# Patient Record
Sex: FEMALE | Race: WHITE | NOT HISPANIC OR LATINO | Employment: FULL TIME | URBAN - METROPOLITAN AREA
[De-identification: names, ages, dates, MRNs, and addresses within clinical notes are randomized per-mention and may not be internally consistent; named-entity substitution may affect disease eponyms.]

---

## 2017-04-26 ENCOUNTER — NON-PROVIDER VISIT (OUTPATIENT)
Dept: OBGYN | Facility: CLINIC | Age: 32
End: 2017-04-26
Payer: MEDICAID

## 2017-04-26 DIAGNOSIS — Z32.01 PREGNANCY EXAMINATION OR TEST, POSITIVE RESULT: ICD-10-CM

## 2017-04-26 LAB
INT CON NEG: NEGATIVE
INT CON POS: POSITIVE
POC URINE PREGNANCY TEST: POSITIVE

## 2017-04-26 PROCEDURE — 81025 URINE PREGNANCY TEST: CPT | Performed by: OBSTETRICS & GYNECOLOGY

## 2017-04-28 ENCOUNTER — HOSPITAL ENCOUNTER (EMERGENCY)
Facility: MEDICAL CENTER | Age: 32
End: 2017-04-28
Payer: MEDICAID

## 2017-04-28 VITALS
BODY MASS INDEX: 32.07 KG/M2 | WEIGHT: 187.83 LBS | DIASTOLIC BLOOD PRESSURE: 69 MMHG | HEIGHT: 64 IN | TEMPERATURE: 97.6 F | HEART RATE: 88 BPM | SYSTOLIC BLOOD PRESSURE: 116 MMHG | RESPIRATION RATE: 16 BRPM | OXYGEN SATURATION: 100 %

## 2017-04-28 PROCEDURE — 302449 STATCHG TRIAGE ONLY (STATISTIC)

## 2017-04-28 ASSESSMENT — PAIN SCALES - GENERAL: PAINLEVEL_OUTOF10: 0

## 2017-04-28 NOTE — ED NOTES
Pt arrived pov with c/o left hand pain/numbness. Pt stated it started several s=days ago. Pt denies trauma. Moving all extremeties. Denies other.

## 2017-04-29 NOTE — ED NOTES
Called Pt name several times in Main Lobby and in Senior Lounge pt didn't answer , will call Pt's name in 10 MINS.

## 2017-05-17 ENCOUNTER — INITIAL PRENATAL (OUTPATIENT)
Dept: OBGYN | Facility: CLINIC | Age: 32
End: 2017-05-17
Payer: MEDICAID

## 2017-05-17 VITALS
HEIGHT: 64 IN | BODY MASS INDEX: 32.1 KG/M2 | DIASTOLIC BLOOD PRESSURE: 60 MMHG | SYSTOLIC BLOOD PRESSURE: 106 MMHG | WEIGHT: 188 LBS

## 2017-05-17 DIAGNOSIS — O20.0 ABORTION, THREATENED, ANTEPARTUM: ICD-10-CM

## 2017-05-17 PROCEDURE — 76805 OB US >/= 14 WKS SNGL FETUS: CPT | Performed by: OBSTETRICS & GYNECOLOGY

## 2017-05-17 NOTE — MR AVS SNAPSHOT
"        Tiffany Shepherd   2017 9:30 AM   Initial Prenatal   MRN: [id number]    [facility]   Dept Phone:  [phone]    Description:  Female : 1985   Provider:  Camron Stone M.D.           Allergies as of 2017     No Known Allergies      Vital Signs     Blood Pressure Height Weight Body Mass Index Smoking Status       106/60 mmHg 1.626 m (5' 4\") 85.276 kg (188 lb) 32.25 kg/m2 Light Tobacco Smoker       Basic Information     Date Of Birth Sex Race Ethnicity Preferred Language    1985 Female White Non- English      Problem List              ICD-10-CM Priority Class Noted - Resolved    Insufficient prenatal care in third trimester O09.33   2015 - Present    Homeless single person Z59.0   2015 - Present    Health examination of prisoner just released from long-term Z02.89   2015 - Present      Health Maintenance        Date Due Completion Dates    PAP SMEAR 2018    IMM DTaP/Tdap/Td Vaccine (2 - Td) 2025            Current Immunizations     MMR Vaccine 2015 10:26 PM    Tdap Vaccine 2015 10:25 PM      Below and/or attached are the medications your provider expects you to take. Review all of your home medications and newly ordered medications with your provider and/or pharmacist. Follow medication instructions as directed by your provider and/or pharmacist. Please keep your medication list with you and share with your provider. Update the information when medications are discontinued, doses are changed, or new medications (including over-the-counter products) are added; and carry medication information at all times in the event of emergency situations     Allergies:  No Known Allergies          Medications  Valid as of: May 17, 2017 - 10:48 AM    Generic Name Brand Name Tablet Size Instructions for use    Cefdinir (Cap) OMNICEF 300 MG Take 1 Cap by mouth 2 times a day.        Docusate Sodium (Cap) COLACE 100 MG Take 1 " Cap by mouth 2 times a day.        Ferrous Sulfate (Tab) ferrous sulfate 325 (65 FE) MG Take 1 Tab by mouth every day.        Ibuprofen (Tab) MOTRIN 800 MG Take 1 Tab by mouth every 6 hours as needed (cramping after delivery).        Norgestimate-Eth Estradiol (Tab) ORTHO-CYCLEN 0.25-35 MG-MCG Take 1 Tab by mouth every day.        Prenatal MV-Min-Fe Fum-FA-DHA   Take  by mouth.        .                 Medicines prescribed today were sent to:     [facility] [address] [place] [address] - [address] [facility] [address]    Phone: [phone] Fax: [phone]    Open 24 Hours?: No      Medication refill instructions:       If your prescription bottle indicates you have medication refills left, it is not necessary to call your provider’s office. Please contact your pharmacy and they will refill your medication.    If your prescription bottle indicates you do not have any refills left, you may request refills at any time through one of the following ways: The online [employer] system (except Urgent Care), by calling your provider’s office, or by asking your pharmacy to contact your provider’s office with a refill request. Medication refills are processed only during regular business hours and may not be available until the next business day. Your provider may request additional information or to have a follow-up visit with you prior to refilling your medication.   *Please Note: Medication refills are assigned a new Rx number when refilled electronically. Your pharmacy may indicate that no refills were authorized even though a new prescription for the same medication is available at the pharmacy. Please request the medicine by name with the pharmacy before contacting your provider for a refill.        Other Notes About Your Plan     GIRL = Karolyn Corona           [employer] Access Code: [email or web address]  Expires: 5/26/2017  4:35 PM    [employer]  A secure, online tool to manage your health information     [facility]’s MyChart® is a secure, online tool that connects you to your personalized health information from the privacy of your home -- day or night - making it very easy for you to manage your healthcare. Once the activation process is completed, you can even access your medical information using the [employer] favio, which is available for free in the Apple Favio store or Google Play store.     [employer] provides the following levels of access (as shown below):   My Chart Features   [facility] Doctor [facility]  Specialists [facility]  Urgent  Care Non-[facility]  Primary Care  Doctor   Email your healthcare team securely and privately 24/7 X X X    Manage appointments: schedule your next appointment; view details of past/upcoming appointments X      Request prescription refills. X      View recent personal medical records, including lab and immunizations X X X X   View health record, including health history, allergies, medications X X X X   Read reports about your outpatient visits, procedures, consult and ER notes X X X X   See your discharge summary, which is a recap of your hospital and/or ER visit that includes your diagnosis, lab results, and care plan. X X       How to register for [employer]  1. Go to  [email or web address].  2. Click on the Sign Up Now box, which takes you to the New Member Sign Up page. You will need to provide the following information:  a. Enter your [employer] Access Code exactly as it appears at the top of this page. (You will not need to use this code after you’ve completed the sign-up process. If you do not sign up before the expiration date, you must request a new code.)   b. Enter your date of birth.   c. Enter your home email address.   d. Click Submit, and follow the next screen’s instructions.  3. Create a [employer] ID. This will be your [employer] login ID and cannot be changed, so think of one that is secure and easy to remember.  4. Create a [employer] password. You can change your password at  any time.  5. Enter your Password Reset Question and Answer. This can be used at a later time if you forget your password.   6. Enter your e-mail address. This allows you to receive e-mail notifications when new information is available in [employer].  7. Click Sign Up. You can now view your health information.    For assistance activating your [employer] account, call [phone]        Quit Tobacco Information     Do you want to quit using tobacco?    Quitting tobacco decreases risks of cancer, heart and lung disease, increases life expectancy, improves sense of taste and smell, and increases spending money, among other benefits.    If you are thinking about quitting, we can help.  • [facility] Quit Tobacco Program: 763.104.1457  o Program occurs weekly for four weeks and includes pharmacist consultation on products to support quitting smoking or chewing tobacco. A provider referral is needed for pharmacist consultation.  • Tobacco Users Help Hotline: 2-018-QUIT-NOW (425-2116) or https://[place].quitlogix.org/  o Free, confidential telephone and online coaching for [place] residents. Sessions are designed on a schedule that is convenient for you. Eligible clients receive free nicotine replacement therapy.  • Nationally: www.smokefree.gov  o Information and professional assistance to support both immediate and long-term needs as you become, and remain, a non-smoker. Smokefree.gov allows you to choose the help that best fits your needs.

## 2017-05-17 NOTE — PROGRESS NOTES
OB ULTRASOUND LIMITED SUMMARY  Per my Read   Transabdominal   Second/third trimester findings: BPD: 4.94 cm, Placenta localization: anterior, Fetal presentation: transverse and US HOOD: 09/20/2017  BPD/HC/AC/FL c/w 61x1dlab   Active Fetus , Positive Cardiac ,   Likely male   Afv normal     Ass:   22 week IUP scan   Poor dates   Grand multiparity   /p   Need NOB

## 2017-05-17 NOTE — PROGRESS NOTES
today. Unsure about LMP  Not taking PNV  Phone # [phone]  Pharmacy verified with patient  Vaginal spotting after sex yesterday

## 2017-05-30 ENCOUNTER — HOSPITAL ENCOUNTER (OUTPATIENT)
Facility: MEDICAL CENTER | Age: 32
End: 2017-05-30
Attending: NURSE PRACTITIONER
Payer: MEDICAID

## 2017-05-30 ENCOUNTER — INITIAL PRENATAL (OUTPATIENT)
Dept: OBGYN | Facility: CLINIC | Age: 32
End: 2017-05-30
Payer: MEDICAID

## 2017-05-30 VITALS — SYSTOLIC BLOOD PRESSURE: 128 MMHG | BODY MASS INDEX: 32.08 KG/M2 | DIASTOLIC BLOOD PRESSURE: 60 MMHG | WEIGHT: 187 LBS

## 2017-05-30 DIAGNOSIS — O24.012 PRE-EXISTING TYPE 1 DIABETES MELLITUS DURING PREGNANCY IN SECOND TRIMESTER: ICD-10-CM

## 2017-05-30 DIAGNOSIS — Z34.90 ENCOUNTER FOR SUPERVISION OF NORMAL PREGNANCY, ANTEPARTUM, UNSPECIFIED GRAVIDITY: ICD-10-CM

## 2017-05-30 DIAGNOSIS — Z34.92: ICD-10-CM

## 2017-05-30 DIAGNOSIS — F17.200 TOBACCO DEPENDENCE: ICD-10-CM

## 2017-05-30 DIAGNOSIS — B96.89 BV (BACTERIAL VAGINOSIS): ICD-10-CM

## 2017-05-30 DIAGNOSIS — N76.0 BV (BACTERIAL VAGINOSIS): ICD-10-CM

## 2017-05-30 PROBLEM — O24.919 DIABETES IN PREGNANCY: Status: ACTIVE | Noted: 2017-05-30

## 2017-05-30 PROCEDURE — 87591 N.GONORRHOEAE DNA AMP PROB: CPT

## 2017-05-30 PROCEDURE — 87491 CHLMYD TRACH DNA AMP PROBE: CPT

## 2017-05-30 PROCEDURE — 59402 PR NEW OB HIGH RISK: CPT | Performed by: NURSE PRACTITIONER

## 2017-05-30 PROCEDURE — 88175 CYTOPATH C/V AUTO FLUID REDO: CPT

## 2017-05-30 RX ORDER — METRONIDAZOLE 500 MG/1
500 TABLET ORAL 2 TIMES DAILY WITH MEALS
Qty: 14 TAB | Refills: 0 | Status: SHIPPED | OUTPATIENT
Start: 2017-05-30 | End: 2017-06-06

## 2017-05-30 NOTE — PROGRESS NOTES
Subjective:      Tiffany Shepherd is a 31 y.o. female who presents with No chief complaint on file.  Social hgistory is complicated by History of drug use, has been incarcerated in the past, no problems with thelegal community for 3 years.  All previous children except 2 year old have been adopted out.  Plans to adopt this baby out.  Is working as  at [email or web address].      Main complaint is swelling and pain in hands.  Understands this is carpal tunnel syndrome.  Has braces for her hands.  Comfort measures reviewed at length.    Diet/exercise reviewed.  Encouraged to quit smoking.  Kudos for her to quit taking drugs.            HPI    ROS       Objective:     /60 mmHg  Wt 84.823 kg (187 lb)     Physical Exam  See PE          Assessment/Plan:     1. Encounter for supervision of normal pregnancy, antepartum, unspecified     - CONSENT FOR CYSTIC FIBROSIS CARRIER TEST  - POCT Urinalysis    2. Pre-existing type 1 diabetes mellitus during pregnancy in second trimester (CMS-[facility])      3. Tobacco dependence      4. Pregnancy with adoption planned, second trimester

## 2017-05-30 NOTE — PROGRESS NOTES
NOB today. Had  on 5/17/17. Patient is thinking to give baby out for adoption. Information provided  On PNV  Last pap   Phone # [phone]  Pharmacy confirmed   + FM, denies VB, LOf or UC's  BTl consent signed  1 GTT pended

## 2017-05-30 NOTE — MR AVS SNAPSHOT
Tiffany Corona Joao   2017 1:30 PM   Initial Prenatal   MRN: [id number]    [facility]   Dept Phone:  [phone]    Description:  Female : 1985   Provider:  YARA Little; [facility] INTAKE           Allergies as of 2017     No Known Allergies      You were diagnosed with     Encounter for supervision of normal pregnancy, antepartum, unspecified    [5027895]       Pre-existing type 1 diabetes mellitus during pregnancy in second trimester (CMS-HCC)   [5631041]       Tobacco dependence   [518869]       Pregnancy with adoption planned, second trimester   [936609]       BV (bacterial vaginosis)   [823081]         Vital Signs     Blood Pressure Weight Smoking Status             128/60 mmHg 84.823 kg (187 lb) Light Tobacco Smoker         Basic Information     Date Of Birth Sex Race Ethnicity Preferred Language    1985 Female White Non- English      Your appointments     2017  2:00 PM   OB Follow Up with Julisa Lazar D.N.P.   [facility] [address] [address] [phone]              Problem List              ICD-10-CM Priority Class Noted - Resolved    Diabetes in pregnancy (CMS-HCC) with baby #3 O24.919   2017 - Present    Tobacco dependence F17.200   2017 - Present    Pregnancy with adoption planned Z34.90   2017 - Present      Health Maintenance        Date Due Completion Dates    PAP SMEAR 2018    IMM DTaP/Tdap/Td Vaccine (2 - Td) 2025            Current Immunizations     MMR Vaccine 2015 10:26 PM    Tdap Vaccine 2015 10:25 PM      Below and/or attached are the medications your provider expects you to take. Review all of your home medications and newly ordered medications with your provider and/or pharmacist. Follow medication instructions as directed by your provider and/or pharmacist. Please keep your medication list with you and share  with your provider. Update the information when medications are discontinued, doses are changed, or new medications (including over-the-counter products) are added; and carry medication information at all times in the event of emergency situations     Allergies:  No Known Allergies          Medications  Valid as of: May 30, 2017 -  2:32 PM    Generic Name Brand Name Tablet Size Instructions for use    Cefdinir (Cap) OMNICEF 300 MG Take 1 Cap by mouth 2 times a day.        Docusate Sodium (Cap) COLACE 100 MG Take 1 Cap by mouth 2 times a day.        Ferrous Sulfate (Tab) ferrous sulfate 325 (65 FE) MG Take 1 Tab by mouth every day.        Ibuprofen (Tab) MOTRIN 800 MG Take 1 Tab by mouth every 6 hours as needed (cramping after delivery).        MetroNIDAZOLE (Tab) FLAGYL 500 MG Take 1 Tab by mouth 2 times a day, with meals for 7 days.        Norgestimate-Eth Estradiol (Tab) ORTHO-CYCLEN 0.25-35 MG-MCG Take 1 Tab by mouth every day.        Prenatal MV-Min-Fe Fum-FA-DHA   Take  by mouth.        .                 Medicines prescribed today were sent to:     [facility] [address] [place] [address] [address] [place], [place] [address] [address] [place] [address]    Phone: [phone] Fax: [phone]    Open 24 Hours?: No      Medication refill instructions:       If your prescription bottle indicates you have medication refills left, it is not necessary to call your provider’s office. Please contact your pharmacy and they will refill your medication.    If your prescription bottle indicates you do not have any refills left, you may request refills at any time through one of the following ways: The online [employer] system (except Urgent Care), by calling your provider’s office, or by asking your pharmacy to contact your provider’s office with a refill request. Medication refills are processed only during regular business hours and may not be available until the next business day. Your provider may request additional information or to have a  follow-up visit with you prior to refilling your medication.   *Please Note: Medication refills are assigned a new Rx number when refilled electronically. Your pharmacy may indicate that no refills were authorized even though a new prescription for the same medication is available at the pharmacy. Please request the medicine by name with the pharmacy before contacting your provider for a refill.        Your To Do List     Future Labs/Procedures Complete By Expires    GLUCOSE 1HR GESTATIONAL  As directed 5/30/2018    PREG CNTR PRENATAL PN  As directed 5/31/2018    THINPREP RFLX HPV ASCUS W/CTNG  As directed 5/31/2018    US-OB 2ND 3RD TRI COMPLETE  As directed 11/30/2017      Other Notes About Your Plan     GIRL = Karolyn Corona           [employer] Access Code: Activation code not generated  Current MyChart Status: Active          Quit Tobacco Information     Do you want to quit using tobacco?    Quitting tobacco decreases risks of cancer, heart and lung disease, increases life expectancy, improves sense of taste and smell, and increases spending money, among other benefits.    If you are thinking about quitting, we can help.  • [facility] Quit Tobacco Program: 462.831.1145  o Program occurs weekly for four weeks and includes pharmacist consultation on products to support quitting smoking or chewing tobacco. A provider referral is needed for pharmacist consultation.  • Tobacco Users Help Hotline: 9-042-QUITNOW (649-2755) or https://[place].quitlogix.org/  o Free, confidential telephone and online coaching for [place] residents. Sessions are designed on a schedule that is convenient for you. Eligible clients receive free nicotine replacement therapy.  • Nationally: www.smokefree.gov  o Information and professional assistance to support both immediate and long-term needs as you become, and remain, a non-smoker. Smokefree.gov allows you to choose the help that best fits your needs.

## 2017-06-01 LAB
C TRACH DNA GENITAL QL NAA+PROBE: POSITIVE
CYTOLOGY REG CYTOL: ABNORMAL
N GONORRHOEA DNA GENITAL QL NAA+PROBE: NEGATIVE
SPECIMEN SOURCE: ABNORMAL

## 2017-06-02 ENCOUNTER — TELEPHONE (OUTPATIENT)
Dept: OBGYN | Facility: CLINIC | Age: 32
End: 2017-06-02

## 2017-06-02 DIAGNOSIS — A74.9 CHLAMYDIA: ICD-10-CM

## 2017-06-02 PROBLEM — R87.613 HSIL ON PAP SMEAR OF CERVIX: Status: ACTIVE | Noted: 2017-06-02

## 2017-06-02 RX ORDER — AZITHROMYCIN 500 MG/1
500 TABLET, FILM COATED ORAL ONCE
Qty: 2 TAB | Refills: 0 | Status: SHIPPED | OUTPATIENT
Start: 2017-06-02 | End: 2017-07-24 | Stop reason: SDUPTHER

## 2017-06-02 RX ORDER — AZITHROMYCIN 500 MG/1
1000 TABLET, FILM COATED ORAL ONCE
Qty: 2 TAB | Refills: 0 | Status: SHIPPED | OUTPATIENT
Start: 2017-06-02 | End: 2017-06-02

## 2017-06-02 NOTE — TELEPHONE ENCOUNTER
Received a call from [facility] Main lab reporting a +chlamydia. Lab results in providers pool to be review.

## 2017-06-02 NOTE — TELEPHONE ENCOUNTER
----- Message from Julisa Lazar D.N.P. sent at 6/2/2017 11:10 AM PDT -----  Positive chlamydia. Order placed for tx. Please contact patient.    Notes Recorded by Julisa Lazar D.N.P. on 6/2/2017 at 11:11 AM  High grade pap, needs colpo    Pt notified of abnormal pap and need colposcopy. Procedure explained to pt. Colpo scheduled for 6/12/17 at 1530. All questions answered. Pt place on hold to schedule appt but call got disconnected. Called pt back and message left to call us back.   Did not discuss +Chlamydia with pt.   Before scheduling pt for colpo dicussed it with Dr. Stone and aware pt been far along but since results are high grade pt schedule to be assess.       Called pt again and Pt notified regarding positive chlamydia and need to  Rx from her pharmacy and Advised pt make a note of the date she took medication because she needs to be retested 3 wks after she had taken meds. Advised for her partner to be treated with his PCP or [facility]. Pt verbalized understanding.   [facility] form and lab results faxed to Aggie Leslie at [facility].

## 2017-06-12 ENCOUNTER — HOSPITAL ENCOUNTER (OUTPATIENT)
Facility: MEDICAL CENTER | Age: 32
End: 2017-06-12
Attending: OBSTETRICS & GYNECOLOGY
Payer: MEDICAID

## 2017-06-12 ENCOUNTER — HOSPITAL ENCOUNTER (OUTPATIENT)
Dept: LAB | Facility: MEDICAL CENTER | Age: 32
End: 2017-06-12
Attending: NURSE PRACTITIONER
Payer: MEDICAID

## 2017-06-12 ENCOUNTER — GYNECOLOGY VISIT (OUTPATIENT)
Dept: OBGYN | Facility: CLINIC | Age: 32
End: 2017-06-12
Payer: MEDICAID

## 2017-06-12 VITALS — WEIGHT: 186 LBS | BODY MASS INDEX: 31.91 KG/M2 | DIASTOLIC BLOOD PRESSURE: 62 MMHG | SYSTOLIC BLOOD PRESSURE: 124 MMHG

## 2017-06-12 DIAGNOSIS — R87.613 HSIL ON PAP SMEAR OF CERVIX: ICD-10-CM

## 2017-06-12 DIAGNOSIS — Z34.90 ENCOUNTER FOR SUPERVISION OF NORMAL PREGNANCY, ANTEPARTUM, UNSPECIFIED GRAVIDITY: ICD-10-CM

## 2017-06-12 DIAGNOSIS — R87.613 HGSIL (HIGH GRADE SQUAMOUS INTRAEPITHELIAL LESION) ON PAP SMEAR OF CERVIX: ICD-10-CM

## 2017-06-12 LAB
ABO GROUP BLD: NORMAL
APPEARANCE UR: ABNORMAL
BACTERIA #/AREA URNS HPF: ABNORMAL /HPF
BASOPHILS # BLD AUTO: 0.5 % (ref 0–1.8)
BASOPHILS # BLD: 0.05 K/UL (ref 0–0.12)
BILIRUB UR QL STRIP.AUTO: NEGATIVE
BLD GP AB SCN SERPL QL: NORMAL
COLOR UR: YELLOW
CULTURE IF INDICATED INDCX: YES UA CULTURE
EOSINOPHIL # BLD AUTO: 0.12 K/UL (ref 0–0.51)
EOSINOPHIL NFR BLD: 1.2 % (ref 0–6.9)
EPI CELLS #/AREA URNS HPF: ABNORMAL /HPF
ERYTHROCYTE [DISTWIDTH] IN BLOOD BY AUTOMATED COUNT: 44.3 FL (ref 35.9–50)
GLUCOSE 1H P 50 G GLC PO SERPL-MCNC: 101 MG/DL (ref 70–139)
GLUCOSE UR STRIP.AUTO-MCNC: NEGATIVE MG/DL
HBV SURFACE AG SER QL: NEGATIVE
HCT VFR BLD AUTO: 37.4 % (ref 37–47)
HGB BLD-MCNC: 12.6 G/DL (ref 12–16)
HIV 1+2 AB+HIV1 P24 AG SERPL QL IA: NON REACTIVE
IMM GRANULOCYTES # BLD AUTO: 0.06 K/UL (ref 0–0.11)
IMM GRANULOCYTES NFR BLD AUTO: 0.6 % (ref 0–0.9)
KETONES UR STRIP.AUTO-MCNC: 10 MG/DL
LEUKOCYTE ESTERASE UR QL STRIP.AUTO: ABNORMAL
LYMPHOCYTES # BLD AUTO: 1.72 K/UL (ref 1–4.8)
LYMPHOCYTES NFR BLD: 16.5 % (ref 22–41)
MCH RBC QN AUTO: 29.6 PG (ref 27–33)
MCHC RBC AUTO-ENTMCNC: 33.7 G/DL (ref 33.6–35)
MCV RBC AUTO: 87.8 FL (ref 81.4–97.8)
MICRO URNS: ABNORMAL
MONOCYTES # BLD AUTO: 0.67 K/UL (ref 0–0.85)
MONOCYTES NFR BLD AUTO: 6.4 % (ref 0–13.4)
MUCOUS THREADS #/AREA URNS HPF: ABNORMAL /HPF
NEUTROPHILS # BLD AUTO: 7.81 K/UL (ref 2–7.15)
NEUTROPHILS NFR BLD: 74.8 % (ref 44–72)
NITRITE UR QL STRIP.AUTO: POSITIVE
NRBC # BLD AUTO: 0 K/UL
NRBC BLD AUTO-RTO: 0 /100 WBC
PATHOLOGY CONSULT NOTE: NORMAL
PH UR STRIP.AUTO: 6.5 [PH]
PLATELET # BLD AUTO: 233 K/UL (ref 164–446)
PMV BLD AUTO: 11.1 FL (ref 9–12.9)
PROT UR QL STRIP: NEGATIVE MG/DL
RBC # BLD AUTO: 4.26 M/UL (ref 4.2–5.4)
RBC # URNS HPF: ABNORMAL /HPF
RBC UR QL AUTO: NEGATIVE
RH BLD: NORMAL
RUBV AB SER QL: 25.4 IU/ML
SP GR UR STRIP.AUTO: 1.01
TREPONEMA PALLIDUM IGG+IGM AB [PRESENCE] IN SERUM OR PLASMA BY IMMUNOASSAY: NON REACTIVE
WBC # BLD AUTO: 10.4 K/UL (ref 4.8–10.8)
WBC #/AREA URNS HPF: ABNORMAL /HPF

## 2017-06-12 PROCEDURE — 88305 TISSUE EXAM BY PATHOLOGIST: CPT

## 2017-06-12 PROCEDURE — 87077 CULTURE AEROBIC IDENTIFY: CPT

## 2017-06-12 PROCEDURE — 86901 BLOOD TYPING SEROLOGIC RH(D): CPT

## 2017-06-12 PROCEDURE — 57454 BX/CURETT OF CERVIX W/SCOPE: CPT | Performed by: OBSTETRICS & GYNECOLOGY

## 2017-06-12 PROCEDURE — 82950 GLUCOSE TEST: CPT

## 2017-06-12 PROCEDURE — 86850 RBC ANTIBODY SCREEN: CPT

## 2017-06-12 PROCEDURE — 81001 URINALYSIS AUTO W/SCOPE: CPT

## 2017-06-12 PROCEDURE — 87186 SC STD MICRODIL/AGAR DIL: CPT

## 2017-06-12 PROCEDURE — 87389 HIV-1 AG W/HIV-1&-2 AB AG IA: CPT

## 2017-06-12 PROCEDURE — 90040 PR PRENATAL FOLLOW UP: CPT | Mod: 25 | Performed by: OBSTETRICS & GYNECOLOGY

## 2017-06-12 PROCEDURE — 86780 TREPONEMA PALLIDUM: CPT

## 2017-06-12 PROCEDURE — 86900 BLOOD TYPING SEROLOGIC ABO: CPT

## 2017-06-12 PROCEDURE — 87086 URINE CULTURE/COLONY COUNT: CPT

## 2017-06-12 PROCEDURE — 85025 COMPLETE CBC W/AUTO DIFF WBC: CPT

## 2017-06-12 PROCEDURE — 86762 RUBELLA ANTIBODY: CPT

## 2017-06-12 PROCEDURE — 87340 HEPATITIS B SURFACE AG IA: CPT

## 2017-06-12 NOTE — PROGRESS NOTES
Colposcopy-OB f/u. + fetal movement.  No VB, LOF or UC's.  PNP, 1 GTT done today  Good phone # [phone]  Preferred pharmacy confirmed.

## 2017-06-12 NOTE — MR AVS SNAPSHOT
Tiffany Shepherd   2017 3:30 PM   Gynecology Visit   MRN: [id number]    Department:  [facility]   Dept Phone:  [phone]    Description:  Female : 1985   Provider:  Angela Warner M.D.           Allergies as of 2017     No Known Allergies      You were diagnosed with     HGSIL (high grade squamous intraepithelial lesion) on Pap smear of cervix   [[id number]]         Vital Signs     Blood Pressure Weight Smoking Status             124/62 mmHg 84.369 kg (186 lb) Light Tobacco Smoker         Basic Information     Date Of Birth Sex Race Ethnicity Preferred Language    1985 Female White Non- English      Your appointments     Jono 15, 2017 10:00 AM   US PREG 60 with PREG CTR US 1   [facility] ([facility])    [facility]  [address] [id number] [phone]           For [facility] patients only: 1. Please arrive 15 min prior to your appointment time. 2. If you're late, you will be rescheduled for the next available appointment. 3. If you need to reschedule your appointment, please call us at [phone] 48 hours prior to your appointment. 4. Do not bring children as they will not be allowed in exam room. 5. Only one family member may be present in room during exam. 6. The exam will be 30-60 minutes depending on the exam ordered by the physician. 7. The sonographer is not allowed to discuss findings during the exam. Your provider will go over the results with you at your next appointment. 8. The purpose of this ultrasound is to determine if baby is healthy. Diagnostic ultrasounds are NOT to determine the gender of the baby. 9. NO photography or video recording is allowed in exam room. 10. NO cell phones allowed in the exam room. INFORMACION SOBRE DRUMMOND ULTRASONIDO 1. Por favor de llegar 15 minutos antes de drummond kyle. 2. Si llega tarde, le tenemos que cambiar la kyle para otra fecha. 3. Si necesita  cambiar drummond kyle, por favor llame 48 horas antes de la kyle. [phone] 4. Por favor no traer niños. No se permiten en cuarto de Ultrasonido. 5. Solamente se permite sarah persona en el cuarto kelsie el examen. 6. El examen dura 30-60 minutos, dependiendo del examen ordenado por el Doctor. 7. El Sonógrafo no está autorizado hablar sobre drummond examen. Drummond doctor o partera le va explicar los resultados en drummond próxima kyle. 8. El propósito del Ultrasonido es para determinar si drummond juan viene saludable. No es para determinar el sexo de drummond juan. 9. Por favor no fotos o cámaras de grabar. 10. No celulares permitidos en el cuarto de examen.            Jun 27, 2017  2:00 PM   OB Follow Up with LYNDSYA GreenePSantosh   [facility] [address] [address]   [phone]              Problem List              ICD-10-CM Priority Class Noted - Resolved    Diabetes in pregnancy (CMS-HCC) with baby #3 O24.919   5/30/2017 - Present    Tobacco dependence F17.200   5/30/2017 - Present    Pregnancy with adoption planned Z34.90   5/30/2017 - Present    Chlamydia A74.9   6/2/2017 - Present    HSIL on Pap smear of cervix R87.613   6/2/2017 - Present      Health Maintenance        Date Due Completion Dates    IMM PNEUMOCOCCAL 19-64 (ADULT) MEDIUM RISK SERIES (1 of 1 - PPSV23) 8/31/2002 ---    PAP SMEAR 5/30/2020 5/30/2017, 6/12/2015    IMM DTaP/Tdap/Td Vaccine (2 - Td) 7/4/2025 7/4/2015            Current Immunizations     MMR Vaccine 7/4/2015 10:26 PM    Tdap Vaccine 7/4/2015 10:25 PM      Below and/or attached are the medications your provider expects you to take. Review all of your home medications and newly ordered medications with your provider and/or pharmacist. Follow medication instructions as directed by your provider and/or pharmacist. Please keep your medication list with you and share with your provider. Update the information when medications are discontinued, doses are changed, or new  medications (including over-the-counter products) are added; and carry medication information at all times in the event of emergency situations     Allergies:  No Known Allergies          Medications  Valid as of: June 12, 2017 -  4:08 PM    Generic Name Brand Name Tablet Size Instructions for use    Cefdinir (Cap) OMNICEF 300 MG Take 1 Cap by mouth 2 times a day.        Docusate Sodium (Cap) COLACE 100 MG Take 1 Cap by mouth 2 times a day.        Ferrous Sulfate (Tab) ferrous sulfate 325 (65 FE) MG Take 1 Tab by mouth every day.        Ibuprofen (Tab) MOTRIN 800 MG Take 1 Tab by mouth every 6 hours as needed (cramping after delivery).        Norgestimate-Eth Estradiol (Tab) ORTHO-CYCLEN 0.25-35 MG-MCG Take 1 Tab by mouth every day.        Prenatal MV-Min-Fe Fum-FA-DHA   Take  by mouth.        .                 Medicines prescribed today were sent to:     [facility]/PH[facility]ACY #[id number] - [place], [place] - [address] [address]    Phone: [phone] Fax: [phone]    Open 24 Hours?: No      Medication refill instructions:       If your prescription bottle indicates you have medication refills left, it is not necessary to call your provider’s office. Please contact your pharmacy and they will refill your medication.    If your prescription bottle indicates you do not have any refills left, you may request refills at any time through one of the following ways: The online [employer] system (except Urgent Care), by calling your provider’s office, or by asking your pharmacy to contact your provider’s office with a refill request. Medication refills are processed only during regular business hours and may not be available until the next business day. Your provider may request additional information or to have a follow-up visit with you prior to refilling your medication.   *Please Note: Medication refills are assigned a new Rx number when refilled electronically. Your  pharmacy may indicate that no refills were authorized even though a new prescription for the same medication is available at the pharmacy. Please request the medicine by name with the pharmacy before contacting your provider for a refill.        Other Notes About Your Plan     GIRL = Karolyn Hennessy Access Code: Activation code not generated  Current MyChart Status: Active          Quit Tobacco Information     Do you want to quit using tobacco?    Quitting tobacco decreases risks of cancer, heart and lung disease, increases life expectancy, improves sense of taste and smell, and increases spending money, among other benefits.    If you are thinking about quitting, we can help.  • [facility] Quit Tobacco Program: 520.434.2451  o Program occurs weekly for four weeks and includes pharmacist consultation on products to support quitting smoking or chewing tobacco. A provider referral is needed for pharmacist consultation.  • Tobacco Users Help Hotline: 6-083-QUITNOW (560-3239) or https://[place].quitlogix.org/  o Free, confidential telephone and online coaching for [place] residents. Sessions are designed on a schedule that is convenient for you. Eligible clients receive free nicotine replacement therapy.  • Nationally: www.smokefree.gov  o Information and professional assistance to support both immediate and long-term needs as you become, and remain, a non-smoker. Smokefree.gov allows you to choose the help that best fits your needs.

## 2017-06-12 NOTE — Clinical Note
COLPOSCOPY / LEEP DATA SHEET    Tiffany Shepherd     1985      31 y.o.      No LMP recorded. Patient is pregnant.     @EDC@       Medical history:   o MVP    o Blood dyscrasia    o Rh     o Other: .    STD history:    o HPV     o HSV     o HIV     o GC     o Chlamydia     o None     o Other: .    HIV:   o Positive     o Negative                            IV Drug User:   o  Yes    o  No .    Age of first coitus:                                                Number of sex partners in lifetime:  .    Reason for exam:.    Prior abnormal PAPS?    o  Yes  o  No        Treatment: .    Prior history of condyloma?    o  Yes  o  No        Treatment:.     Colposcopic view - description:                                 Satisfactory?    o  Yes  o  No                    Squamo-columnar junction seen?  o  Yes  o  No.    Entire lesion seen?     o  Yes  o  No          PAP done?  o  Yes  o  No           Biopsies done?  o  Yes  o  No.    Biopsy sites:.    ECC done?    o  Yes  o  No      If no, reason:.    Impression:.    Recommendations:.             Colposcopist: ______________________________________________ Date: ___________________

## 2017-06-12 NOTE — PROGRESS NOTES
Indications for colposcopy are reviewed with patient  Pap smear shows Moderately to severely abnormal (HGSIL-encompassing moderate and severe dysplasi  Risks of colposcopy are discussed and informed consent is signed  The patient is placed in dorsal lithotomy position, speculum is inserted into the vagina and the cervix was visualized  Acetic acid is applied to the cervix  The colposcope was then used to evaluate the cervix  The squamocolumnar junction is not seen in its entirety.  Cervix is floppy and redundant, copious white BV discharge is present, Cervix extremely friable after biopsy.  Findings for colposcopy are: Unable to assess superior margin of TZ.  Inferior margin with AWE and cobblestoning. Bx performed at 6 oclock.   ECC is not performed  Colposcopy is not considered adequate  Hemostasis is achieved with Monsel solution  Patient did not tolerate the procedure well.  Very anxious.     FHTs 160s after colpo.  Pt took Azithromycin.  Needs to take Flagyl as prescribed.  Evaluation inadequate - will need colpo 6 wks postpartum.   Please see animation located in media, colposcopy/LEEP data sheet    The patient should followup in 1-2 weeks for results review  Pelvic rest x1 week

## 2017-06-14 LAB
BACTERIA UR CULT: ABNORMAL
SIGNIFICANT IND 70042: ABNORMAL
SOURCE SOURCE: ABNORMAL

## 2017-06-15 ENCOUNTER — APPOINTMENT (OUTPATIENT)
Dept: RADIOLOGY | Facility: IMAGING CENTER | Age: 32
End: 2017-06-15
Attending: NURSE PRACTITIONER
Payer: MEDICAID

## 2017-06-15 ENCOUNTER — TELEPHONE (OUTPATIENT)
Dept: OBGYN | Facility: CLINIC | Age: 32
End: 2017-06-15

## 2017-06-15 DIAGNOSIS — Z34.90 ENCOUNTER FOR SUPERVISION OF NORMAL PREGNANCY, ANTEPARTUM, UNSPECIFIED GRAVIDITY: ICD-10-CM

## 2017-06-15 DIAGNOSIS — O23.42 UTI (URINARY TRACT INFECTION) IN PREGNANCY IN SECOND TRIMESTER: Primary | ICD-10-CM

## 2017-06-15 PROCEDURE — 76805 OB US >/= 14 WKS SNGL FETUS: CPT | Performed by: OBSTETRICS & GYNECOLOGY

## 2017-06-15 RX ORDER — NITROFURANTOIN 25; 75 MG/1; MG/1
100 CAPSULE ORAL 2 TIMES DAILY
Qty: 14 CAP | Refills: 0 | Status: SHIPPED | OUTPATIENT
Start: 2017-06-15 | End: 2017-06-22

## 2017-06-15 NOTE — TELEPHONE ENCOUNTER
----- Message from Galina Mohan C.N.M. sent at 6/15/2017  8:08 AM PDT -----  +UTI, will send rx to pharmacy.      Pt notified of UTI and needs to start on antibiotics, instructions given. Advised pt to increase water intake to minimum of 4 lt of water a day. Rx sent by provider. Pharmacy verified with pt.

## 2017-06-16 PROBLEM — Z67.91 RH NEGATIVE, ANTEPARTUM: Status: ACTIVE | Noted: 2017-06-16

## 2017-06-16 PROBLEM — O26.899 RH NEGATIVE, ANTEPARTUM: Status: ACTIVE | Noted: 2017-06-16

## 2017-06-19 ENCOUNTER — TELEPHONE (OUTPATIENT)
Dept: OBGYN | Facility: CLINIC | Age: 32
End: 2017-06-19

## 2017-06-19 NOTE — TELEPHONE ENCOUNTER
Aggie from the [facility] called in regards to Pt Tiffany Shepherd. Per Aggie pt was treated with azithromycin, but her partner refuses to be treated. Pt informed Aggie that she had been sexually active with her partner and may have been re-exposed. Aggie advises that Pt be re-tested and if needed re-treated.

## 2017-06-28 ENCOUNTER — HOSPITAL ENCOUNTER (OUTPATIENT)
Facility: MEDICAL CENTER | Age: 32
End: 2017-06-28
Attending: NURSE PRACTITIONER
Payer: MEDICAID

## 2017-06-28 ENCOUNTER — ROUTINE PRENATAL (OUTPATIENT)
Dept: OBGYN | Facility: CLINIC | Age: 32
End: 2017-06-28
Payer: MEDICAID

## 2017-06-28 VITALS — SYSTOLIC BLOOD PRESSURE: 118 MMHG | WEIGHT: 187 LBS | DIASTOLIC BLOOD PRESSURE: 68 MMHG | BODY MASS INDEX: 32.08 KG/M2

## 2017-06-28 DIAGNOSIS — Z67.91 RH NEGATIVE, ANTEPARTUM, THIRD TRIMESTER: ICD-10-CM

## 2017-06-28 DIAGNOSIS — Z34.83 ENCOUNTER FOR SUPERVISION OF OTHER NORMAL PREGNANCY, THIRD TRIMESTER: Primary | ICD-10-CM

## 2017-06-28 DIAGNOSIS — O26.893 RH NEGATIVE STATE IN ANTEPARTUM PERIOD, THIRD TRIMESTER: ICD-10-CM

## 2017-06-28 DIAGNOSIS — A74.9 CHLAMYDIA INFECTION AFFECTING PREGNANCY: ICD-10-CM

## 2017-06-28 DIAGNOSIS — O26.893 RH NEGATIVE, ANTEPARTUM, THIRD TRIMESTER: ICD-10-CM

## 2017-06-28 DIAGNOSIS — Z67.91 RH NEGATIVE STATE IN ANTEPARTUM PERIOD, THIRD TRIMESTER: ICD-10-CM

## 2017-06-28 DIAGNOSIS — O98.819 CHLAMYDIA INFECTION AFFECTING PREGNANCY: ICD-10-CM

## 2017-06-28 PROCEDURE — 87491 CHLMYD TRACH DNA AMP PROBE: CPT

## 2017-06-28 PROCEDURE — 90040 PR PRENATAL FOLLOW UP: CPT | Performed by: NURSE PRACTITIONER

## 2017-06-28 PROCEDURE — 90471 IMMUNIZATION ADMIN: CPT | Mod: 59 | Performed by: NURSE PRACTITIONER

## 2017-06-28 PROCEDURE — 96372 THER/PROPH/DIAG INJ SC/IM: CPT | Performed by: NURSE PRACTITIONER

## 2017-06-28 PROCEDURE — 87591 N.GONORRHOEAE DNA AMP PROB: CPT

## 2017-06-28 PROCEDURE — 90715 TDAP VACCINE 7 YRS/> IM: CPT | Performed by: NURSE PRACTITIONER

## 2017-06-28 NOTE — Clinical Note
"Count Your Baby's Movements  Another step to a healthy delivery    Tiffany Shepherd              Dept: [phone]    How Many Weeks Pregnant 28w0d    Date to Begin Countin17              How to use this chart    One way for your physician to keep track of your baby's health is by knowing how often the baby moves (or \"kicks\") in your womb.  You can help your physician to do this by using this chart every day.    Every day, you should see how many hours it takes for your baby to move 10 times.  Start in the morning, as soon as you get up.    · First, write down the time your baby moves until you get to 10.  · Check off one box every time your baby moves until you get to 10.  · Write down the time you finished counting in the last column.  · Total how long it took to count up all 10 movements.  · Finally, fill in the box that shows how long this took.  After counting 10 movements, you no longer have to count any more that day.  The next morning, just start counting again as soon as you get up.    What should you call a \"movement\"?  It is hard to say, because it will feel different from one mother to another and from one pregnancy to the next.  The important thing is that you count the movements the same way throughout your pregnancy.  If you have more questions, you should ask your physician.    Count carefully every day!  SAMPLE:  Week 28    How many hours did it take to feel 10 movements?       Start  Time     1     2     3     4     5     6     7     8     9     10   Finish Time   Mon 8:20 ·  ·  ·  ·  ·  ·  ·  ·  ·  ·  11:40                  Fri               Sat               Sun                 IMPORTANT: You should contact your physician if it takes more than two hours for you to feel 10 movements.  Each morning, write down the time and start to count the movements of your baby.  Keep track by checking off one box every time you feel one movement.  When you have " "felt 10 \"kicks\", write down the time you finished counting in the last column.  Then fill in the   box (over the check betina) for the number of hours it took.  Be sure to read the complete instructions on the previous page.            "

## 2017-06-28 NOTE — Clinical Note
June 28, 2017         Patient: Tiffany Shepherd   YOB: 1985   Date of Visit: 6/28/2017           To Whom it May Concern:    Tifafny Shepherd was seen in my clinic on 6/28/2017, please excuse her for being late to work today due to Doctor appointment.     If you have any questions or concerns, please don't hesitate to call.        Sincerely,                       Alyssa Mahoney C.N.M.

## 2017-06-28 NOTE — PROGRESS NOTES
Pt. Here for OB/F/U today Kick Count sheet given and explained to pt.   Good FM  Good # [phone]  Pt states having numbness in hands.   Pharmacy verified.   BTL consent form signed today.  JAGDEEP for Chlamydia today, patient believes she has been exposed again.   Tdap today.

## 2017-06-28 NOTE — MR AVS SNAPSHOT
Tiffany Shepherd   2017 8:30 AM   Routine Prenatal   MRN: [id number]    [facility]   Dept Phone:  [phone]    Description:  Female : 1985   Provider:  Alyssa Mahoney C.N.M.           Allergies as of 2017     No Known Allergies      You were diagnosed with     Encounter for supervision of other normal pregnancy, third trimester   [[id number]]  -  Primary     Rh negative state in antepartum period, third trimester   [[id number]]       Chlamydia infection affecting pregnancy   [0720256]         Vital Signs     Blood Pressure Weight Smoking Status             118/68 mmHg 84.823 kg (187 lb) Light Tobacco Smoker         Basic Information     Date Of Birth Sex Race Ethnicity Preferred Language    1985 Female White Non- English      Problem List              ICD-10-CM Priority Class Noted - Resolved    Diabetes in pregnancy (CMS-HCC) with baby #3 O24.919   2017 - Present    Tobacco dependence F17.200   2017 - Present    Pregnancy with adoption planned Z34.90   2017 - Present    Chlamydia A74.9   2017 - Present    HSIL on Pap smear of cervix R87.613   2017 - Present    Rh negative, antepartum O09.899   2017 - Present      Health Maintenance        Date Due Completion Dates    IMM PNEUMOCOCCAL 19-64 (ADULT) MEDIUM RISK SERIES (1 of 1 - PPSV23) 2002 ---    PAP SMEAR 2020, 2015    IMM DTaP/Tdap/Td Vaccine (2 - Td) 2025            Current Immunizations     MMR Vaccine 2015 10:26 PM    Tdap Vaccine 2017  9:31 AM, 2015 10:25 PM      Below and/or attached are the medications your provider expects you to take. Review all of your home medications and newly ordered medications with your provider and/or pharmacist. Follow medication instructions as directed by your provider and/or pharmacist. Please keep your medication list with you and share with your provider. Update the information when  medications are discontinued, doses are changed, or new medications (including over-the-counter products) are added; and carry medication information at all times in the event of emergency situations     Allergies:  No Known Allergies          Medications  Valid as of: June 28, 2017 -  9:50 AM    Generic Name Brand Name Tablet Size Instructions for use    Cefdinir (Cap) OMNICEF 300 MG Take 1 Cap by mouth 2 times a day.        Docusate Sodium (Cap) COLACE 100 MG Take 1 Cap by mouth 2 times a day.        Ferrous Sulfate (Tab) ferrous sulfate 325 (65 FE) MG Take 1 Tab by mouth every day.        Ibuprofen (Tab) MOTRIN 800 MG Take 1 Tab by mouth every 6 hours as needed (cramping after delivery).        Norgestimate-Eth Estradiol (Tab) ORTHO-CYCLEN 0.25-35 MG-MCG Take 1 Tab by mouth every day.        Prenatal MV-Min-Fe Fum-FA-DHA   Take  by mouth.        .                 Medicines prescribed today were sent to:     [facility]/PHARMACY #[id number] - [place], [place] - [address] [place] [address]    Phone: [phone] Fax: [phone]    Open 24 Hours?: No      Medication refill instructions:       If your prescription bottle indicates you have medication refills left, it is not necessary to call your provider’s office. Please contact your pharmacy and they will refill your medication.    If your prescription bottle indicates you do not have any refills left, you may request refills at any time through one of the following ways: The online [employer] system (except Urgent Care), by calling your provider’s office, or by asking your pharmacy to contact your provider’s office with a refill request. Medication refills are processed only during regular business hours and may not be available until the next business day. Your provider may request additional information or to have a follow-up visit with you prior to refilling your medication.   *Please Note: Medication refills are assigned a  new Rx number when refilled electronically. Your pharmacy may indicate that no refills were authorized even though a new prescription for the same medication is available at the pharmacy. Please request the medicine by name with the pharmacy before contacting your provider for a refill.        Your To Do List     Future Labs/Procedures Complete By Expires    CHLAMYDIA/GC PCR URINE OR SWAB  As directed 6/29/2018      Other Notes About Your Plan     GIRL = Karolyn Corona           MyChart Access Code: Activation code not generated  Current MyChart Status: Active          Quit Tobacco Information     Do you want to quit using tobacco?    Quitting tobacco decreases risks of cancer, heart and lung disease, increases life expectancy, improves sense of taste and smell, and increases spending money, among other benefits.    If you are thinking about quitting, we can help.  • [facility] Quit Tobacco Program: 435.236.9148  o Program occurs weekly for four weeks and includes pharmacist consultation on products to support quitting smoking or chewing tobacco. A provider referral is needed for pharmacist consultation.  • Tobacco Users Help Hotline: 5-784-QUITNOW (228-7329) or https://[place].quitlogix.org/  o Free, confidential telephone and online coaching for [place] residents. Sessions are designed on a schedule that is convenient for you. Eligible clients receive free nicotine replacement therapy.  • Nationally: www.smokefree.gov  o Information and professional assistance to support both immediate and long-term needs as you become, and remain, a non-smoker. Smokefree.gov allows you to choose the help that best fits your needs.

## 2017-06-28 NOTE — PROGRESS NOTES
S) Pt is a 31 y.o.   at 28w0d  gestation. Routine prenatal care today. No complaints today. Is upset with FOB regarding his lack of concern for the health and wellbeing of her and the baby. He has not yet been treated for Chlamydia saying its just too hard to make it to the health department to get it done. Prescription given to patient today for his treatment. Will retreat if + again.    Fetal movement Normal  Cramping no,  VB no  LOF no   Denies dysuria. Generally feels well today. Good self-care activities identified. Denies headaches, swelling, visual changes, or epigastric pain .     O) Blood pressure 118/68, weight 84.823 kg (187 lb), unknown if currently breastfeeding.        Labs:       PNL: WNL except for +Chlamydia       GCT: 101       AFP: Not done       GBS: N/A       Pertinent ultrasound -        6/15/17- Survey WNL except for mildly enlarge renal pelvises, and a ? Dec femur length. VERENICE 19.05 cm. C/w dates    A) IUP at 28w0d       S=D         Patient Active Problem List    Diagnosis Date Noted   • Rh negative, antepartum 2017   • Chlamydia 2017   • HSIL on Pap smear of cervix 2017   • Diabetes in pregnancy (CMS-formerly [facility]) with baby #3 2017   • Tobacco dependence 2017   • Pregnancy with adoption planned 2017                 TDAP: yes       BTL: yes       : n/a    P) s/s ptl vs general discomforts. Fetal movements reviewed. General ed and anticipatory guidance. Nutrition/exercise/vitamin. Plans breast Plans pp contraception- BTL.  Continue PNV.

## 2017-06-29 DIAGNOSIS — Z34.83 ENCOUNTER FOR SUPERVISION OF OTHER NORMAL PREGNANCY, THIRD TRIMESTER: ICD-10-CM

## 2017-06-29 DIAGNOSIS — A74.9 CHLAMYDIA INFECTION AFFECTING PREGNANCY: ICD-10-CM

## 2017-06-29 DIAGNOSIS — O98.819 CHLAMYDIA INFECTION AFFECTING PREGNANCY: ICD-10-CM

## 2017-06-29 LAB
C TRACH DNA SPEC QL NAA+PROBE: NEGATIVE
N GONORRHOEA DNA SPEC QL NAA+PROBE: NEGATIVE
SPECIMEN SOURCE: NORMAL

## 2017-07-24 ENCOUNTER — ROUTINE PRENATAL (OUTPATIENT)
Dept: OBGYN | Facility: CLINIC | Age: 32
End: 2017-07-24

## 2017-07-24 VITALS — SYSTOLIC BLOOD PRESSURE: 112 MMHG | WEIGHT: 190 LBS | DIASTOLIC BLOOD PRESSURE: 70 MMHG | BODY MASS INDEX: 32.6 KG/M2

## 2017-07-24 DIAGNOSIS — A74.9 CHLAMYDIA: ICD-10-CM

## 2017-07-24 DIAGNOSIS — Z67.91 RH NEGATIVE, ANTEPARTUM, THIRD TRIMESTER: ICD-10-CM

## 2017-07-24 DIAGNOSIS — O26.893 RH NEGATIVE, ANTEPARTUM, THIRD TRIMESTER: ICD-10-CM

## 2017-07-24 PROCEDURE — 90040 PR PRENATAL FOLLOW UP: CPT | Performed by: NURSE PRACTITIONER

## 2017-07-24 RX ORDER — AZITHROMYCIN 500 MG/1
1000 TABLET, FILM COATED ORAL ONCE
Qty: 2 TAB | Refills: 0 | Status: SHIPPED | OUTPATIENT
Start: 2017-07-24 | End: 2017-07-24

## 2017-07-24 NOTE — MR AVS SNAPSHOT
Tiffany Shepherd   2017 2:15 PM   Routine Prenatal   MRN: [id number]    [facility]   Dept Phone:  [phone]    Description:  Female : 1985   Provider:  Alyssa Mahoney C.N.M.           Allergies as of 2017     No Known Allergies      You were diagnosed with     Rh negative, antepartum, third trimester   [[id number]]       Chlamydia   [[id number]]         Vital Signs     Blood Pressure Weight Smoking Status             112/70 mmHg 86.183 kg (190 lb) Light Tobacco Smoker         Basic Information     Date Of Birth Sex Race Ethnicity Preferred Language    1985 Female White Non- English      Problem List              ICD-10-CM Priority Class Noted - Resolved    Diabetes in pregnancy (CMS-HCC) with baby #3 O24.919   2017 - Present    Tobacco dependence F17.200   2017 - Present    Pregnancy with adoption planned Z34.90   2017 - Present    Chlamydia A74.9   2017 - Present    HSIL on Pap smear of cervix R87.613   2017 - Present    Rh negative, antepartum O09.899   2017 - Present      Health Maintenance        Date Due Completion Dates    IMM PNEUMOCOCCAL 19-64 (ADULT) MEDIUM RISK SERIES (1 of 1 - PPSV23) 2002 ---    IMM INFLUENZA (1) 2017 ---    PAP SMEAR 2020, 2015    IMM DTaP/Tdap/Td Vaccine (2 - Td) 2027, 2015            Current Immunizations     MMR Vaccine 2015 10:26 PM    Tdap Vaccine 2017  9:31 AM, 2015 10:25 PM      Below and/or attached are the medications your provider expects you to take. Review all of your home medications and newly ordered medications with your provider and/or pharmacist. Follow medication instructions as directed by your provider and/or pharmacist. Please keep your medication list with you and share with your provider. Update the information when medications are discontinued, doses are changed, or new medications (including over-the-counter  products) are added; and carry medication information at all times in the event of emergency situations     Allergies:  No Known Allergies          Medications  Valid as of: July 24, 2017 -  2:57 PM    Generic Name Brand Name Tablet Size Instructions for use    Azithromycin (Tab) ZITHROMAX 500 MG Take 2 Tabs by mouth Once for 1 dose.        Cefdinir (Cap) OMNICEF 300 MG Take 1 Cap by mouth 2 times a day.        Docusate Sodium (Cap) COLACE 100 MG Take 1 Cap by mouth 2 times a day.        Ferrous Sulfate (Tab) ferrous sulfate 325 (65 FE) MG Take 1 Tab by mouth every day.        Ibuprofen (Tab) MOTRIN 800 MG Take 1 Tab by mouth every 6 hours as needed (cramping after delivery).        Norgestimate-Eth Estradiol (Tab) ORTHO-CYCLEN 0.25-35 MG-MCG Take 1 Tab by mouth every day.        Prenatal MV-Min-Fe Fum-FA-DHA   Take  by mouth.        .                 Medicines prescribed today were sent to:     [facility]/PHARMACY #[id number] - [place], [place] - [address] [place] [address]    Phone: [phone] Fax: [phone]    Open 24 Hours?: No      Medication refill instructions:       If your prescription bottle indicates you have medication refills left, it is not necessary to call your provider’s office. Please contact your pharmacy and they will refill your medication.    If your prescription bottle indicates you do not have any refills left, you may request refills at any time through one of the following ways: The online [employer] system (except Urgent Care), by calling your provider’s office, or by asking your pharmacy to contact your provider’s office with a refill request. Medication refills are processed only during regular business hours and may not be available until the next business day. Your provider may request additional information or to have a follow-up visit with you prior to refilling your medication.   *Please Note: Medication refills are assigned a new Rx number  when refilled electronically. Your pharmacy may indicate that no refills were authorized even though a new prescription for the same medication is available at the pharmacy. Please request the medicine by name with the pharmacy before contacting your provider for a refill.        Other Notes About Your Plan     GIRL = Karolyn Hennessy Access Code: Activation code not generated  Current MyChart Status: Active          Quit Tobacco Information     Do you want to quit using tobacco?    Quitting tobacco decreases risks of cancer, heart and lung disease, increases life expectancy, improves sense of taste and smell, and increases spending money, among other benefits.    If you are thinking about quitting, we can help.  • [facility] Quit Tobacco Program: 514.980.5403  o Program occurs weekly for four weeks and includes pharmacist consultation on products to support quitting smoking or chewing tobacco. A provider referral is needed for pharmacist consultation.  • Tobacco Users Help Hotline: 4-199-QUIT-NOW (956-3027) or https://[place].quitlogix.org/  o Free, confidential telephone and online coaching for [place] residents. Sessions are designed on a schedule that is convenient for you. Eligible clients receive free nicotine replacement therapy.  • Nationally: www.smokefree.gov  o Information and professional assistance to support both immediate and long-term needs as you become, and remain, a non-smoker. Smokefree.gov allows you to choose the help that best fits your needs.

## 2017-07-24 NOTE — PROGRESS NOTES
S) Pt is a 31 y.o.   at 31w5d  gestation. Routine prenatal care today. No OB complaints. Patient took her medication for Chlamydia, FOB didn't. FOB refuses treatment and is still sexually active with patient. Is still having discharge and itching. Sent prescription in for Azythromycin again. Will do JAGDEEP in 4-5 weeks. Educated on importance of no sex with him until they are both treated and asymptomatic. Is having financial issues. Resource list given. Patient is very teary, just feels like she can't catch a break. Encouraged to call if she is feeling too overwhelmed to function, or feels like she is going to harm self or others.     Fetal movement Normal  Cramping no,  VB no  LOF no   Denies dysuria. Generally feels well today. Good self-care activities identified. Denies headaches, swelling, visual changes, or epigastric pain .     O) Blood pressure 112/70, weight 86.183 kg (190 lb), unknown if currently breastfeeding.        Labs:       PNL: WNL       GCT: 101       AFP: Not done       GBS: N/A       Pertinent ultrasound -        6/15/17- Survey WNL except for mildly prominent bilateral renal pelves. The left renal pelvis measuring 0.44 cm. The right renal pelvis measuring 0.39 cm. Slightly decreased femur length.  VERENICE 19.05cm, c/w prev dating    A) IUP at 31w5d       S=D         Patient Active Problem List    Diagnosis Date Noted   • Rh negative, antepartum 2017   • Chlamydia 2017   • HSIL on Pap smear of cervix 2017   • Diabetes in pregnancy (CMS-[facility]) with baby #3 2017   • Tobacco dependence 2017   • Pregnancy with adoption planned 2017                 TDAP: yes       BTL: yes       : n/a    P) s/s ptl vs general discomforts. Fetal movements reviewed. General ed and anticipatory guidance. Nutrition/exercise/vitamin. Plans breast Plans pp contraception- BTL.  Continue PNV.

## 2017-07-24 NOTE — PROGRESS NOTES
Ob f/u. + fetal movement   No VB, LOF or contractions discharge, itching.  C/O  Hands numbness daily   Phone number  [phone]  Pharmacy verified with patient  WT= 190lbs            WG=533/70  Pt need belly strap

## 2017-08-07 ENCOUNTER — ROUTINE PRENATAL (OUTPATIENT)
Dept: OBGYN | Facility: CLINIC | Age: 32
End: 2017-08-07

## 2017-08-07 VITALS — BODY MASS INDEX: 32.6 KG/M2 | DIASTOLIC BLOOD PRESSURE: 74 MMHG | WEIGHT: 190 LBS | SYSTOLIC BLOOD PRESSURE: 126 MMHG

## 2017-08-07 DIAGNOSIS — O26.893 RH NEGATIVE, ANTEPARTUM, THIRD TRIMESTER: ICD-10-CM

## 2017-08-07 DIAGNOSIS — Z67.91 RH NEGATIVE, ANTEPARTUM, THIRD TRIMESTER: ICD-10-CM

## 2017-08-07 PROCEDURE — 90040 PR PRENATAL FOLLOW UP: CPT | Performed by: NURSE PRACTITIONER

## 2017-08-07 NOTE — PROGRESS NOTES
S) Pt is a 31 y.o.   at 33w5d  gestation. Routine prenatal care today. Complains of numbness in hands. Carpal tunnel comfort measures discussed.    Fetal movement Normal  Cramping no,  VB no  LOF no   Denies dysuria. Generally feels well today. Good self-care activities identified. Denies headaches, swelling, visual changes, or epigastric pain .     O) Blood pressure 126/74, weight 86.183 kg (190 lb), unknown if currently breastfeeding.        Labs:       PNL: WNL       GCT: 101       AFP: Not done       GBS: N/A       Pertinent ultrasound -        6/15/17- Survey WNL, VERENICE 19.05cm, c/w prev dating.    A) IUP at 33w5d       S=D         Patient Active Problem List    Diagnosis Date Noted   • Rh negative, antepartum 2017   • Chlamydia 2017   • HSIL on Pap smear of cervix 2017   • Diabetes in pregnancy (CMS-[facility]) with baby #3 2017   • Tobacco dependence 2017   • Pregnancy with adoption planned 2017                 TDAP: yes       BTL: yes       : n/a    P) s/s ptl vs general discomforts. Fetal movements reviewed. General ed and anticipatory guidance. Nutrition/exercise/vitamin. Plans breast Plans pp contraception- BTL  Continue PNV.

## 2017-08-07 NOTE — MR AVS SNAPSHOT
Tiffany Shepherd   2017 3:30 PM   Routine Prenatal   MRN: [id number]    Department[facility]   Dept Phone:  [phone]    Description:  Female : 1985   Provider:  Alyssa Mahoney C.N.M.           Allergies as of 2017     No Known Allergies      You were diagnosed with     Rh negative, antepartum, third trimester   [[id number]]         Vital Signs     Blood Pressure Weight Smoking Status             126/74 mmHg 86.183 kg (190 lb) Light Tobacco Smoker         Basic Information     Date Of Birth Sex Race Ethnicity Preferred Language    1985 Female White Non- English      Your appointments     Aug 23, 2017  2:15 PM   OB Follow Up with PHIL Kelly   [facility] [address]  [facility] [address] [phone]              Problem List              ICD-10-CM Priority Class Noted - Resolved    Diabetes in pregnancy (CMS-HCC) with baby #3 O24.919 Medium  2017 - Present    Tobacco dependence F17.200 Medium  2017 - Present    Pregnancy with adoption planned Z34.90 High  2017 - Present    Chlamydia A74.9 Medium  2017 - Present    HSIL on Pap smear of cervix R87.613 Medium  2017 - Present    Rh negative, antepartum O09.899 High  2017 - Present      Health Maintenance        Date Due Completion Dates    IMM PNEUMOCOCCAL 19-64 (ADULT) MEDIUM RISK SERIES (1 of 1 - PPSV23) 2002 ---    IMM INFLUENZA (1) 2017 ---    PAP SMEAR 2020, 2015    IMM DTaP/Tdap/Td Vaccine (2 - Td) 2027, 2015            Current Immunizations     MMR Vaccine 2015 10:26 PM    Tdap Vaccine 2017  9:31 AM, 2015 10:25 PM      Below and/or attached are the medications your provider expects you to take. Review all of your home medications and newly ordered medications with your provider and/or pharmacist. Follow medication instructions as directed by your provider and/or pharmacist. Please  keep your medication list with you and share with your provider. Update the information when medications are discontinued, doses are changed, or new medications (including over-the-counter products) are added; and carry medication information at all times in the event of emergency situations     Allergies:  No Known Allergies          Medications  Valid as of: August 07, 2017 -  4:12 PM    Generic Name Brand Name Tablet Size Instructions for use    Cefdinir (Cap) OMNICEF 300 MG Take 1 Cap by mouth 2 times a day.        Docusate Sodium (Cap) COLACE 100 MG Take 1 Cap by mouth 2 times a day.        Ferrous Sulfate (Tab) ferrous sulfate 325 (65 FE) MG Take 1 Tab by mouth every day.        Ibuprofen (Tab) MOTRIN 800 MG Take 1 Tab by mouth every 6 hours as needed (cramping after delivery).        Norgestimate-Eth Estradiol (Tab) ORTHO-CYCLEN 0.25-35 MG-MCG Take 1 Tab by mouth every day.        Prenatal MV-Min-Fe Fum-FA-DHA   Take  by mouth.        .                 Medicines prescribed today were sent to:     [facility]/PHARMACY #[id number] - [place], [place] - [address] [place] [address]    Phone: [phone] Fax: [phone]    Open 24 Hours?: No      Medication refill instructions:       If your prescription bottle indicates you have medication refills left, it is not necessary to call your provider’s office. Please contact your pharmacy and they will refill your medication.    If your prescription bottle indicates you do not have any refills left, you may request refills at any time through one of the following ways: The online dVisit system (except Urgent Care), by calling your provider’s office, or by asking your pharmacy to contact your provider’s office with a refill request. Medication refills are processed only during regular business hours and may not be available until the next business day. Your provider may request additional information or to have a follow-up visit  with you prior to refilling your medication.   *Please Note: Medication refills are assigned a new Rx number when refilled electronically. Your pharmacy may indicate that no refills were authorized even though a new prescription for the same medication is available at the pharmacy. Please request the medicine by name with the pharmacy before contacting your provider for a refill.        Other Notes About Your Plan     PNL WNL, AFP WNL, , US WNL- GIRL = Karolyn Corona, Tdap done, BTL yes           MyChart Access Code: Activation code not generated  Current MyChart Status: Active          Quit Tobacco Information     Do you want to quit using tobacco?    Quitting tobacco decreases risks of cancer, heart and lung disease, increases life expectancy, improves sense of taste and smell, and increases spending money, among other benefits.    If you are thinking about quitting, we can help.  • [facility] Quit Tobacco Program: 212.855.1486  o Program occurs weekly for four weeks and includes pharmacist consultation on products to support quitting smoking or chewing tobacco. A provider referral is needed for pharmacist consultation.  • Tobacco Users Help Hotline: 2-024-QUIT-NOW (861-4217) or https://[place].quitlogix.org/  o Free, confidential telephone and online coaching for [place] residents. Sessions are designed on a schedule that is convenient for you. Eligible clients receive free nicotine replacement therapy.  • Nationally: www.smokefree.gov  o Information and professional assistance to support both immediate and long-term needs as you become, and remain, a non-smoker. Smokefree.gov allows you to choose the help that best fits your needs.

## 2017-08-23 ENCOUNTER — HOSPITAL ENCOUNTER (OUTPATIENT)
Facility: MEDICAL CENTER | Age: 32
End: 2017-08-23
Attending: PHYSICIAN ASSISTANT

## 2017-08-23 ENCOUNTER — ROUTINE PRENATAL (OUTPATIENT)
Dept: OBGYN | Facility: CLINIC | Age: 32
End: 2017-08-23

## 2017-08-23 VITALS — BODY MASS INDEX: 33.8 KG/M2 | SYSTOLIC BLOOD PRESSURE: 130 MMHG | DIASTOLIC BLOOD PRESSURE: 84 MMHG | WEIGHT: 197 LBS

## 2017-08-23 DIAGNOSIS — Z34.93: Primary | ICD-10-CM

## 2017-08-23 DIAGNOSIS — Z34.93: ICD-10-CM

## 2017-08-23 DIAGNOSIS — O26.893 RH NEGATIVE, ANTEPARTUM, THIRD TRIMESTER: ICD-10-CM

## 2017-08-23 DIAGNOSIS — Z67.91 RH NEGATIVE, ANTEPARTUM, THIRD TRIMESTER: ICD-10-CM

## 2017-08-23 PROBLEM — A74.9 CHLAMYDIA: Status: RESOLVED | Noted: 2017-06-02 | Resolved: 2017-08-23

## 2017-08-23 PROCEDURE — 90040 PR PRENATAL FOLLOW UP: CPT | Performed by: PHYSICIAN ASSISTANT

## 2017-08-23 PROCEDURE — 87653 STREP B DNA AMP PROBE: CPT

## 2017-08-23 NOTE — MR AVS SNAPSHOT
Tiffany Shepherd   2017 2:15 PM   Routine Prenatal   MRN: [id number]    [facility]   Dept Phone:  [phone]    Description:  Female : 1985   Provider:  PHIL Kelly           Allergies as of 2017     No Known Allergies      You were diagnosed with     Pregnancy with adoption planned, third trimester   [[id number]]  -  Primary     Rh negative, antepartum, third trimester   [[id number]]         Vital Signs     Blood Pressure Weight Smoking Status             130/84 mmHg 89.359 kg (197 lb) Light Tobacco Smoker         Basic Information     Date Of Birth Sex Race Ethnicity Preferred Language    1985 Female White Non- English      Problem List              ICD-10-CM Priority Class Noted - Resolved    Diabetes in pregnancy (CMS-HCC) with baby #3 O24.919 Medium  2017 - Present    Tobacco dependence F17.200 Medium  2017 - Present    Pregnancy with adoption planned Z34.90 High  2017 - Present    HSIL on Pap smear of cervix R87.613 Medium  2017 - Present    Rh negative, antepartum - Rhogam given 17 O09.899 High  2017 - Present      Health Maintenance        Date Due Completion Dates    IMM PNEUMOCOCCAL 19-64 (ADULT) MEDIUM RISK SERIES (1 of 1 - PPSV23) 2002 ---    IMM INFLUENZA (1) 2017 ---    PAP SMEAR 2020, 2015    IMM DTaP/Tdap/Td Vaccine (2 - Td) 2027, 2015            Current Immunizations     MMR Vaccine 2015 10:26 PM    Tdap Vaccine 2017  9:31 AM, 2015 10:25 PM      Below and/or attached are the medications your provider expects you to take. Review all of your home medications and newly ordered medications with your provider and/or pharmacist. Follow medication instructions as directed by your provider and/or pharmacist. Please keep your medication list with you and share with your provider. Update the information when medications are discontinued, doses are  changed, or new medications (including over-the-counter products) are added; and carry medication information at all times in the event of emergency situations     Allergies:  No Known Allergies          Medications  Valid as of: August 23, 2017 -  2:48 PM    Generic Name Brand Name Tablet Size Instructions for use    Cefdinir (Cap) OMNICEF 300 MG Take 1 Cap by mouth 2 times a day.        Docusate Sodium (Cap) COLACE 100 MG Take 1 Cap by mouth 2 times a day.        Ferrous Sulfate (Tab) ferrous sulfate 325 (65 Fe) MG Take 1 Tab by mouth every day.        Ibuprofen (Tab) MOTRIN 800 MG Take 1 Tab by mouth every 6 hours as needed (cramping after delivery).        Norgestimate-Eth Estradiol (Tab) ORTHO-CYCLEN 0.25-35 MG-MCG Take 1 Tab by mouth every day.        Prenatal MV-Min-Fe Fum-FA-DHA   Take  by mouth.        .                 Medicines prescribed today were sent to:     [facility]/PHARMACY #[id number] - [place], [place] - [address] [address]    Phone: [phone] Fax: [phone]    Open 24 Hours?: No      Medication refill instructions:       If your prescription bottle indicates you have medication refills left, it is not necessary to call your provider’s office. Please contact your pharmacy and they will refill your medication.    If your prescription bottle indicates you do not have any refills left, you may request refills at any time through one of the following ways: The online [employer] system (except Urgent Care), by calling your provider’s office, or by asking your pharmacy to contact your provider’s office with a refill request. Medication refills are processed only during regular business hours and may not be available until the next business day. Your provider may request additional information or to have a follow-up visit with you prior to refilling your medication.   *Please Note: Medication refills are assigned a new Rx number when refilled  electronically. Your pharmacy may indicate that no refills were authorized even though a new prescription for the same medication is available at the pharmacy. Please request the medicine by name with the pharmacy before contacting your provider for a refill.        Your To Do List     Future Labs/Procedures Complete By Expires    GRP B STREP, BY PCR (MINAYA BROTH)  As directed 8/23/2018    Comments:    SOURCE VAGINAL AND RECTAL    INDUCTION OF LABOR  As directed 8/23/2018      Other Notes About Your Plan     PNL WNL, AFP WNL, , US WNL- GIRL = Karolyn Corona, Tdap done, BTL yes           MyChart Access Code: Activation code not generated  Current MyChart Status: Active          Quit Tobacco Information     Do you want to quit using tobacco?    Quitting tobacco decreases risks of cancer, heart and lung disease, increases life expectancy, improves sense of taste and smell, and increases spending money, among other benefits.    If you are thinking about quitting, we can help.  • [facility] Quit Tobacco Program: 869.728.2237  o Program occurs weekly for four weeks and includes pharmacist consultation on products to support quitting smoking or chewing tobacco. A provider referral is needed for pharmacist consultation.  • Tobacco Users Help Hotline: 5-825-QUIT-NOW (608-8889) or https://[place].quitlogix.org/  o Free, confidential telephone and online coaching for [place] residents. Sessions are designed on a schedule that is convenient for you. Eligible clients receive free nicotine replacement therapy.  • Nationally: www.smokefree.gov  o Information and professional assistance to support both immediate and long-term needs as you become, and remain, a non-smoker. Smokefree.gov allows you to choose the help that best fits your needs.

## 2017-08-23 NOTE — Clinical Note
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 Thank you.     If you have any questions or concerns, please don't hesitate to call [phone]          Sincerely,          CONNIE Kelly.

## 2017-08-23 NOTE — PROGRESS NOTES
Pt has no complaints with cramping, UCs, VB, LOF. +FM. GBS done today. Labor precautions reviewed. Pt notified of GC/CT results, which were wnl. Pt planning adoption, so asking about an earlier IOL - will schedule IOL for ~40 wk, as adoptive family planning on attending birth and they live in [place]. IOl referral sent today. Work note given for pt to pair up with another , as she has been working on her own and is exhausted, though pt wants to continue working until delivery. RTC 1 wk or sooner prn.

## 2017-08-24 LAB — GP B STREP DNA SPEC QL NAA+PROBE: NEGATIVE

## 2017-08-31 ENCOUNTER — ROUTINE PRENATAL (OUTPATIENT)
Dept: OBGYN | Facility: CLINIC | Age: 32
End: 2017-08-31

## 2017-08-31 VITALS — DIASTOLIC BLOOD PRESSURE: 82 MMHG | SYSTOLIC BLOOD PRESSURE: 128 MMHG

## 2017-08-31 DIAGNOSIS — Z34.83 ENCOUNTER FOR SUPERVISION OF OTHER NORMAL PREGNANCY IN THIRD TRIMESTER: Primary | ICD-10-CM

## 2017-08-31 PROCEDURE — 90040 PR PRENATAL FOLLOW UP: CPT | Performed by: NURSE PRACTITIONER

## 2017-08-31 NOTE — PROGRESS NOTES
Ob f/u. + fetal movement   No VB, LOF or contractions   C/O burning itching   Phone number # [phone]  Pharmacy verified with patient  WT=  196 lbs           SF=842/82  Pt was informed today for IOL on 08/16/2017 08:00 am

## 2017-08-31 NOTE — PROGRESS NOTES
S:  Pt is  at 37w1d here for routine OB follow up.  Reports itching following sexual intercourse.  Reports good FM.  Denies VB, LOF, RUCs, or vaginal DC.     O:  Please see above vitals.        FHTs: 140        Fundal ht: 37 cm        Fetal position: vertex        SVE: /3        GBS negative -- reviewed w pt.          Wet mount: negative     A:  IUP at 37w1d  Patient Active Problem List    Diagnosis Date Noted   • Rh negative, antepartum - Rhogam given 2017     Priority: High   • Pregnancy with adoption planned 2017     Priority: High   • HSIL on Pap smear of cervix 2017     Priority: Medium   • Diabetes in pregnancy (CMS-HCC) with baby #3 2017     Priority: Medium   • Tobacco dependence 2017     Priority: Medium       P:  1.  Continue FKCs.         2.  Labor precautions given.  Instructions given on where to go.  Pt receptive to education.         3.  D/w pt IOL policy.  IOL scheduled.        4.  Questions answered.         5.  Encouraged adequate water intake       6.  F/u 1wk       7.  Information given for Monistat.

## 2017-09-03 ENCOUNTER — HOSPITAL ENCOUNTER (OUTPATIENT)
Facility: MEDICAL CENTER | Age: 32
End: 2017-09-03
Attending: OBSTETRICS & GYNECOLOGY | Admitting: OBSTETRICS & GYNECOLOGY

## 2017-09-03 VITALS
HEIGHT: 64 IN | HEART RATE: 88 BPM | BODY MASS INDEX: 33.97 KG/M2 | DIASTOLIC BLOOD PRESSURE: 92 MMHG | WEIGHT: 199 LBS | SYSTOLIC BLOOD PRESSURE: 147 MMHG | TEMPERATURE: 98 F

## 2017-09-03 LAB
ALBUMIN SERPL BCP-MCNC: 3.1 G/DL (ref 3.2–4.9)
ALBUMIN/GLOB SERPL: 1 G/DL
ALP SERPL-CCNC: 199 U/L (ref 30–99)
ALT SERPL-CCNC: 14 U/L (ref 2–50)
AMPHET UR QL SCN: POSITIVE
ANION GAP SERPL CALC-SCNC: 9 MMOL/L (ref 0–11.9)
APPEARANCE UR: ABNORMAL
AST SERPL-CCNC: 16 U/L (ref 12–45)
BARBITURATES UR QL SCN: NEGATIVE
BASOPHILS # BLD AUTO: 0.3 % (ref 0–1.8)
BASOPHILS # BLD: 0.08 K/UL (ref 0–0.12)
BENZODIAZ UR QL SCN: NEGATIVE
BILIRUB SERPL-MCNC: 0.4 MG/DL (ref 0.1–1.5)
BUN SERPL-MCNC: 6 MG/DL (ref 8–22)
BZE UR QL SCN: NEGATIVE
CALCIUM SERPL-MCNC: 8.9 MG/DL (ref 8.5–10.5)
CANNABINOIDS UR QL SCN: POSITIVE
CHLORIDE SERPL-SCNC: 106 MMOL/L (ref 96–112)
CO2 SERPL-SCNC: 19 MMOL/L (ref 20–33)
COLOR UR AUTO: ABNORMAL
CREAT SERPL-MCNC: 0.48 MG/DL (ref 0.5–1.4)
EOSINOPHIL # BLD AUTO: 0.09 K/UL (ref 0–0.51)
EOSINOPHIL NFR BLD: 0.4 % (ref 0–6.9)
ERYTHROCYTE [DISTWIDTH] IN BLOOD BY AUTOMATED COUNT: 43.1 FL (ref 35.9–50)
GFR SERPL CREATININE-BSD FRML MDRD: >60 ML/MIN/1.73 M 2
GLOBULIN SER CALC-MCNC: 3 G/DL (ref 1.9–3.5)
GLUCOSE SERPL-MCNC: 87 MG/DL (ref 65–99)
GLUCOSE UR QL STRIP.AUTO: NEGATIVE MG/DL
HCT VFR BLD AUTO: 34.5 % (ref 37–47)
HGB BLD-MCNC: 11.6 G/DL (ref 12–16)
IMM GRANULOCYTES # BLD AUTO: 0.47 K/UL (ref 0–0.11)
IMM GRANULOCYTES NFR BLD AUTO: 2 % (ref 0–0.9)
KETONES UR QL STRIP.AUTO: NEGATIVE MG/DL
LEUKOCYTE ESTERASE UR QL STRIP.AUTO: ABNORMAL
LYMPHOCYTES # BLD AUTO: 1.18 K/UL (ref 1–4.8)
LYMPHOCYTES NFR BLD: 4.9 % (ref 22–41)
MCH RBC QN AUTO: 28.6 PG (ref 27–33)
MCHC RBC AUTO-ENTMCNC: 33.6 G/DL (ref 33.6–35)
MCV RBC AUTO: 85 FL (ref 81.4–97.8)
METHADONE UR QL SCN: NEGATIVE
MONOCYTES # BLD AUTO: 1.08 K/UL (ref 0–0.85)
MONOCYTES NFR BLD AUTO: 4.5 % (ref 0–13.4)
NEUTROPHILS # BLD AUTO: 21.05 K/UL (ref 2–7.15)
NEUTROPHILS NFR BLD: 87.9 % (ref 44–72)
NITRITE UR QL STRIP.AUTO: NEGATIVE
NRBC # BLD AUTO: 0 K/UL
NRBC BLD AUTO-RTO: 0 /100 WBC
OPIATES UR QL SCN: NEGATIVE
OXYCODONE UR QL SCN: NEGATIVE
PCP UR QL SCN: NEGATIVE
PH UR STRIP.AUTO: 7.5 [PH]
PLATELET # BLD AUTO: 191 K/UL (ref 164–446)
PMV BLD AUTO: 10.4 FL (ref 9–12.9)
POTASSIUM SERPL-SCNC: 3.7 MMOL/L (ref 3.6–5.5)
PROPOXYPH UR QL SCN: NEGATIVE
PROT SERPL-MCNC: 6.1 G/DL (ref 6–8.2)
PROT UR QL STRIP: ABNORMAL MG/DL
RBC # BLD AUTO: 4.06 M/UL (ref 4.2–5.4)
RBC UR QL AUTO: NEGATIVE
SODIUM SERPL-SCNC: 134 MMOL/L (ref 135–145)
SP GR UR: 1.02
URATE SERPL-MCNC: 4.4 MG/DL (ref 1.9–8.2)
WBC # BLD AUTO: 24 K/UL (ref 4.8–10.8)

## 2017-09-03 PROCEDURE — 81002 URINALYSIS NONAUTO W/O SCOPE: CPT

## 2017-09-03 PROCEDURE — 80307 DRUG TEST PRSMV CHEM ANLYZR: CPT

## 2017-09-03 PROCEDURE — 84550 ASSAY OF BLOOD/URIC ACID: CPT

## 2017-09-03 PROCEDURE — 80053 COMPREHEN METABOLIC PANEL: CPT

## 2017-09-03 PROCEDURE — 59025 FETAL NON-STRESS TEST: CPT | Performed by: PHYSICIAN ASSISTANT

## 2017-09-03 PROCEDURE — 85025 COMPLETE CBC W/AUTO DIFF WBC: CPT

## 2017-09-04 NOTE — PROGRESS NOTES
" 33y/o  edc 17 , EGA 37 , Here to l&d room LDA 3 with \"Forrest\". C/O Abdominal pain. EFM/TOCO applied, patients states positive fetal movement. Denies vaginal bleeding or leaking of fluid. SVE 3/thick/3SHAHNAZ updated and orders received, patient states she doesn't do methamphetamines but lives in a house where they do.  2100 Discharge orders received   discharge instructions provided   patient off floor stable on feet  "

## 2017-09-07 ENCOUNTER — ROUTINE PRENATAL (OUTPATIENT)
Dept: OBGYN | Facility: CLINIC | Age: 32
End: 2017-09-07

## 2017-09-07 ENCOUNTER — HOSPITAL ENCOUNTER (INPATIENT)
Facility: MEDICAL CENTER | Age: 32
LOS: 2 days | End: 2017-09-09
Attending: OBSTETRICS & GYNECOLOGY | Admitting: OBSTETRICS & GYNECOLOGY

## 2017-09-07 VITALS — WEIGHT: 200 LBS | SYSTOLIC BLOOD PRESSURE: 175 MMHG | BODY MASS INDEX: 34.33 KG/M2 | DIASTOLIC BLOOD PRESSURE: 105 MMHG

## 2017-09-07 DIAGNOSIS — O16.3 ELEVATED BLOOD PRESSURE AFFECTING PREGNANCY IN THIRD TRIMESTER, ANTEPARTUM: ICD-10-CM

## 2017-09-07 DIAGNOSIS — Z34.93: Primary | ICD-10-CM

## 2017-09-07 LAB
ALBUMIN SERPL BCP-MCNC: 2.8 G/DL (ref 3.2–4.9)
ALBUMIN/GLOB SERPL: 0.9 G/DL
ALP SERPL-CCNC: 163 U/L (ref 30–99)
ALT SERPL-CCNC: 13 U/L (ref 2–50)
AMPHET UR QL SCN: POSITIVE
ANION GAP SERPL CALC-SCNC: 7 MMOL/L (ref 0–11.9)
APPEARANCE UR: NORMAL
AST SERPL-CCNC: 18 U/L (ref 12–45)
BARBITURATES UR QL SCN: NEGATIVE
BASOPHILS # BLD AUTO: 0.4 % (ref 0–1.8)
BASOPHILS # BLD: 0.06 K/UL (ref 0–0.12)
BENZODIAZ UR QL SCN: NEGATIVE
BILIRUB SERPL-MCNC: 0.3 MG/DL (ref 0.1–1.5)
BILIRUB UR STRIP-MCNC: NORMAL MG/DL
BUN SERPL-MCNC: 7 MG/DL (ref 8–22)
BZE UR QL SCN: NEGATIVE
CALCIUM SERPL-MCNC: 8.8 MG/DL (ref 8.5–10.5)
CANNABINOIDS UR QL SCN: POSITIVE
CHLORIDE SERPL-SCNC: 107 MMOL/L (ref 96–112)
CO2 SERPL-SCNC: 20 MMOL/L (ref 20–33)
COLOR UR AUTO: NORMAL
CREAT SERPL-MCNC: 0.51 MG/DL (ref 0.5–1.4)
EOSINOPHIL # BLD AUTO: 0.06 K/UL (ref 0–0.51)
EOSINOPHIL NFR BLD: 0.4 % (ref 0–6.9)
ERYTHROCYTE [DISTWIDTH] IN BLOOD BY AUTOMATED COUNT: 43.4 FL (ref 35.9–50)
GFR SERPL CREATININE-BSD FRML MDRD: >60 ML/MIN/1.73 M 2
GLOBULIN SER CALC-MCNC: 3 G/DL (ref 1.9–3.5)
GLUCOSE SERPL-MCNC: 80 MG/DL (ref 65–99)
GLUCOSE UR STRIP.AUTO-MCNC: NEGATIVE MG/DL
HCT VFR BLD AUTO: 32.7 % (ref 37–47)
HGB BLD-MCNC: 11.1 G/DL (ref 12–16)
HOLDING TUBE BB 8507: NORMAL
IMM GRANULOCYTES # BLD AUTO: 0.3 K/UL (ref 0–0.11)
IMM GRANULOCYTES NFR BLD AUTO: 2.1 % (ref 0–0.9)
KETONES UR STRIP.AUTO-MCNC: NEGATIVE MG/DL
LEUKOCYTE ESTERASE UR QL STRIP.AUTO: NORMAL
LYMPHOCYTES # BLD AUTO: 2.29 K/UL (ref 1–4.8)
LYMPHOCYTES NFR BLD: 15.7 % (ref 22–41)
MAGNESIUM SERPL-MCNC: 3.5 MG/DL (ref 1.5–2.5)
MCH RBC QN AUTO: 28.8 PG (ref 27–33)
MCHC RBC AUTO-ENTMCNC: 33.9 G/DL (ref 33.6–35)
MCV RBC AUTO: 84.7 FL (ref 81.4–97.8)
METHADONE UR QL SCN: NEGATIVE
MONOCYTES # BLD AUTO: 0.73 K/UL (ref 0–0.85)
MONOCYTES NFR BLD AUTO: 5 % (ref 0–13.4)
NEUTROPHILS # BLD AUTO: 11.11 K/UL (ref 2–7.15)
NEUTROPHILS NFR BLD: 76.4 % (ref 44–72)
NITRITE UR QL STRIP.AUTO: POSITIVE
NRBC # BLD AUTO: 0 K/UL
NRBC BLD AUTO-RTO: 0 /100 WBC
OPIATES UR QL SCN: NEGATIVE
OXYCODONE UR QL SCN: NEGATIVE
PCP UR QL SCN: NEGATIVE
PH UR STRIP.AUTO: 6.5 [PH] (ref 5–8)
PLATELET # BLD AUTO: 187 K/UL (ref 164–446)
PMV BLD AUTO: 10.9 FL (ref 9–12.9)
POTASSIUM SERPL-SCNC: 3.7 MMOL/L (ref 3.6–5.5)
PROPOXYPH UR QL SCN: NEGATIVE
PROT SERPL-MCNC: 5.8 G/DL (ref 6–8.2)
PROT UR QL STRIP: 300 MG/DL
RBC # BLD AUTO: 3.86 M/UL (ref 4.2–5.4)
RBC UR QL AUTO: NORMAL
SODIUM SERPL-SCNC: 134 MMOL/L (ref 135–145)
SP GR UR STRIP.AUTO: 1.02
URATE SERPL-MCNC: 4.4 MG/DL (ref 1.9–8.2)
UROBILINOGEN UR STRIP-MCNC: NORMAL MG/DL
WBC # BLD AUTO: 14.6 K/UL (ref 4.8–10.8)

## 2017-09-07 PROCEDURE — 81002 URINALYSIS NONAUTO W/O SCOPE: CPT | Performed by: NURSE PRACTITIONER

## 2017-09-07 PROCEDURE — 80053 COMPREHEN METABOLIC PANEL: CPT

## 2017-09-07 PROCEDURE — 700105 HCHG RX REV CODE 258: Performed by: STUDENT IN AN ORGANIZED HEALTH CARE EDUCATION/TRAINING PROGRAM

## 2017-09-07 PROCEDURE — 700101 HCHG RX REV CODE 250

## 2017-09-07 PROCEDURE — 85025 COMPLETE CBC W/AUTO DIFF WBC: CPT

## 2017-09-07 PROCEDURE — A9270 NON-COVERED ITEM OR SERVICE: HCPCS | Performed by: STUDENT IN AN ORGANIZED HEALTH CARE EDUCATION/TRAINING PROGRAM

## 2017-09-07 PROCEDURE — 90040 PR PRENATAL FOLLOW UP: CPT | Performed by: NURSE PRACTITIONER

## 2017-09-07 PROCEDURE — 80307 DRUG TEST PRSMV CHEM ANLYZR: CPT

## 2017-09-07 PROCEDURE — 84550 ASSAY OF BLOOD/URIC ACID: CPT

## 2017-09-07 PROCEDURE — 700102 HCHG RX REV CODE 250 W/ 637 OVERRIDE(OP): Performed by: STUDENT IN AN ORGANIZED HEALTH CARE EDUCATION/TRAINING PROGRAM

## 2017-09-07 PROCEDURE — 83735 ASSAY OF MAGNESIUM: CPT

## 2017-09-07 PROCEDURE — 700111 HCHG RX REV CODE 636 W/ 250 OVERRIDE (IP): Performed by: STUDENT IN AN ORGANIZED HEALTH CARE EDUCATION/TRAINING PROGRAM

## 2017-09-07 PROCEDURE — 770002 HCHG ROOM/CARE - OB PRIVATE (112)

## 2017-09-07 PROCEDURE — 36415 COLL VENOUS BLD VENIPUNCTURE: CPT

## 2017-09-07 PROCEDURE — 3E033VJ INTRODUCTION OF OTHER HORMONE INTO PERIPHERAL VEIN, PERCUTANEOUS APPROACH: ICD-10-PCS | Performed by: OBSTETRICS & GYNECOLOGY

## 2017-09-07 PROCEDURE — 700111 HCHG RX REV CODE 636 W/ 250 OVERRIDE (IP)

## 2017-09-07 RX ORDER — LABETALOL HYDROCHLORIDE 5 MG/ML
20-80 INJECTION, SOLUTION INTRAVENOUS PRN
Status: DISCONTINUED | OUTPATIENT
Start: 2017-09-07 | End: 2017-09-08

## 2017-09-07 RX ORDER — HYDROXYZINE HYDROCHLORIDE 25 MG/ML
50 INJECTION, SOLUTION INTRAMUSCULAR ONCE
Status: DISCONTINUED | OUTPATIENT
Start: 2017-09-07 | End: 2017-09-08

## 2017-09-07 RX ORDER — MAGNESIUM SULFATE HEPTAHYDRATE 40 MG/ML
2 INJECTION, SOLUTION INTRAVENOUS CONTINUOUS
Status: DISCONTINUED | OUTPATIENT
Start: 2017-09-07 | End: 2017-09-09 | Stop reason: HOSPADM

## 2017-09-07 RX ORDER — HYDRALAZINE HYDROCHLORIDE 20 MG/ML
5-10 INJECTION INTRAMUSCULAR; INTRAVENOUS PRN
Status: DISCONTINUED | OUTPATIENT
Start: 2017-09-07 | End: 2017-09-08

## 2017-09-07 RX ORDER — MAGNESIUM SULFATE HEPTAHYDRATE 40 MG/ML
4 INJECTION, SOLUTION INTRAVENOUS ONCE
Status: COMPLETED | OUTPATIENT
Start: 2017-09-07 | End: 2017-09-07

## 2017-09-07 RX ORDER — ONDANSETRON 2 MG/ML
4 INJECTION INTRAMUSCULAR; INTRAVENOUS EVERY 6 HOURS PRN
Status: DISCONTINUED | OUTPATIENT
Start: 2017-09-07 | End: 2017-09-09 | Stop reason: HOSPADM

## 2017-09-07 RX ORDER — METHYLERGONOVINE MALEATE 0.2 MG/ML
0.2 INJECTION INTRAVENOUS
Status: DISCONTINUED | OUTPATIENT
Start: 2017-09-07 | End: 2017-09-08 | Stop reason: HOSPADM

## 2017-09-07 RX ORDER — SODIUM CHLORIDE, SODIUM LACTATE, POTASSIUM CHLORIDE, CALCIUM CHLORIDE 600; 310; 30; 20 MG/100ML; MG/100ML; MG/100ML; MG/100ML
INJECTION, SOLUTION INTRAVENOUS CONTINUOUS
Status: DISPENSED | OUTPATIENT
Start: 2017-09-07 | End: 2017-09-08

## 2017-09-07 RX ORDER — MISOPROSTOL 200 UG/1
800 TABLET ORAL
Status: COMPLETED | OUTPATIENT
Start: 2017-09-07 | End: 2017-09-08

## 2017-09-07 RX ORDER — ONDANSETRON 4 MG/1
4 TABLET, ORALLY DISINTEGRATING ORAL EVERY 6 HOURS PRN
Status: DISCONTINUED | OUTPATIENT
Start: 2017-09-07 | End: 2017-09-09 | Stop reason: HOSPADM

## 2017-09-07 RX ORDER — ALUMINA, MAGNESIA, AND SIMETHICONE 2400; 2400; 240 MG/30ML; MG/30ML; MG/30ML
30 SUSPENSION ORAL EVERY 6 HOURS PRN
Status: DISCONTINUED | OUTPATIENT
Start: 2017-09-07 | End: 2017-09-08 | Stop reason: HOSPADM

## 2017-09-07 RX ADMIN — SODIUM CHLORIDE, POTASSIUM CHLORIDE, SODIUM LACTATE AND CALCIUM CHLORIDE: 600; 310; 30; 20 INJECTION, SOLUTION INTRAVENOUS at 17:21

## 2017-09-07 RX ADMIN — SODIUM CHLORIDE, POTASSIUM CHLORIDE, SODIUM LACTATE AND CALCIUM CHLORIDE: 600; 310; 30; 20 INJECTION, SOLUTION INTRAVENOUS at 17:47

## 2017-09-07 RX ADMIN — ALUMINUM HYDROXIDE, MAGNESIUM HYDROXIDE,SIMETHICONE 30 ML: 400; 400; 40 LIQUID ORAL at 21:00

## 2017-09-07 RX ADMIN — MAGNESIUM SULFATE IN WATER 4 G: 40 INJECTION, SOLUTION INTRAVENOUS at 17:21

## 2017-09-07 RX ADMIN — MAGNESIUM SULFATE IN WATER 2 G/HR: 40 INJECTION, SOLUTION INTRAVENOUS at 17:30

## 2017-09-07 ASSESSMENT — LIFESTYLE VARIABLES
EVER_SMOKED: YES
DO YOU DRINK ALCOHOL: NO
ALCOHOL_USE: NO

## 2017-09-07 NOTE — PROGRESS NOTES
Ob F/U  +FM  Pt c/o contractions.   Good phone hloicz-[phone]  GBS negative  Pt informed about IOL-Sat Sept 16 at 8:00am.

## 2017-09-07 NOTE — PROGRESS NOTES
S) Pt is a 32 y.o.   at 38w1d  gestation. Routine prenatal care today. Complains of increased swelling, headaches, some visual changes yesterday, but no epigastric pain. BP elevated when taken by MA. Retaken by myself, 175/105. UA shows 3+ protein, but also + blood.  Had +UDS 9/3/17 while at hospital. Patient denies using any drugs, but lives in a household with ex-boyfriend who uses meth.  Planned adopt out, adoptive mother with her at appt today.  All questions answered.   Fetal movement Normal  Cramping yes,  VB no  LOF no   Denies dysuria. Generally feels well today. Good self-care activities identified. Denies epigastric pain.  To labor and delivery for further evaluation. Charge RN notified as well as resident.    O) Blood pressure (!) 162/100, weight 90.7 kg (200 lb), unknown if currently breastfeeding.        Labs:       PNL: WNL       GCT: 101       AFP: Not done       GBS: negative       Pertinent ultrasound -        6/15/17- Survey WNL except for dilated renal pelvises, VERENICE 19.05cm, c/w prev dating    A) IUP at 38w1d       S=D         Patient Active Problem List    Diagnosis Date Noted   • Rh negative, antepartum - Rhogam given 2017     Priority: High   • Pregnancy with adoption planned 2017     Priority: High   • HSIL on Pap smear of cervix 2017     Priority: Medium   • Diabetes in pregnancy (CMS-HCC) with baby #3 2017     Priority: Medium   • Tobacco dependence 2017     Priority: Medium                 TDAP: yes       BTL: yes       : n/a    P) s/s ptl vs general discomforts. Fetal movements reviewed. General ed and anticipatory guidance. Nutrition/exercise/vitamin. Plans breast Plans pp contraception- BTL.  Continue PNV. To labor and delivery now for further evaluation, possible IOL.

## 2017-09-07 NOTE — H&P
History and Physical      Tiffany Shepherd is a 32 y.o. year old female  at 38w1d by 22w ultrasound who presents from clinic for elevated blood pressures to 175/102 and urine dip 3+ protein. She has had some headaches and says she had 1 episode of blurry vision yesterday. She said she used meth throughout the pregnancy most recently last night as well as marijuana (last use this morning) and half pack per day cigarette smoking.     Subjective:   negative  For CTXS.   positive Feels pain   negative for LOF  negative for vaginal bleeding.   positive for fetal movement    ROS: Pertinent items are noted in HPI.    Past Medical History:   Diagnosis Date   • Rh negative, antepartum 2017   • Diabetes in pregnancy (CMS-HCC) with baby #3 2017   • Tobacco dependence 2017   • Pregnancy with adoption planned 2017   • Insufficient prenatal care in third trimester 2015   • Kidney disease 2015    Pt. states was hospitalized   • Diabetes (CMS-HCC)     gdm A1   • Substance abuse     meth, marijuana pt. states last used 2015   • Urinary tract infection, site not specified      No past surgical history on file.  OB History    Para Term  AB Living   7 3 3   2 4   SAB TAB Ectopic Molar Multiple Live Births           0 4      # Outcome Date GA Lbr Devin/2nd Weight Sex Delivery Anes PTL Lv   7 Current            6 Term 07/04/15 40w4d  3.654 kg (8 lb 0.9 oz) F Vag-Spont   ERIBERTO      Birth Comments: [place]   5 AB  15w0d          4 Term 11 40w0d  3.742 kg (8 lb 4 oz) F Vag-Spont EPI  ERIBERTO      Complications: GDM, class A1      Birth Comments: [place]   3 AB  8w0d          2 Term 07 40w0d  3.345 kg (7 lb 6 oz) M Vag-Spont   ERIBERTO      Birth Comments: [place]   1 Term 03 40w0d  3.544 kg (7 lb 13 oz) M Vag-Spont EPI  ERIBERTO      Birth Comments: [place]        Social History     Social History   • Marital status: Single     Spouse name: N/A   • Number of children:  N/A   • Years of education: N/A     Occupational History   • Not on file.     Social History Main Topics   • Smoking status: Light Tobacco Smoker     Packs/day: 1.00     Years: 15.00     Types: Cigarettes     Last attempt to quit: 4/23/2015   • Smokeless tobacco: Former User      Comment: 3-5 at day   • Alcohol use No   • Drug use:      Types: Methamphetamines, Marijuana      Comment: Pt. states last used 2/2015. marijuana used 4/2016   • Sexual activity: Yes     Partners: Male      Comment: none     Other Topics Concern   • Not on file     Social History Narrative   • No narrative on file     Allergies: Review of patient's allergies indicates no known allergies.  No current facility-administered medications for this encounter.     Prenatal care with [facility] starting at 22weeks with the following problems:  Patient Active Problem List    Diagnosis Date Noted   • Rh negative, antepartum - Rhogam given 6/28/17 06/16/2017     Priority: High   • Pregnancy with adoption planned 05/30/2017     Priority: High   • HSIL on Pap smear of cervix 06/02/2017     Priority: Medium   • Diabetes in pregnancy (CMS-[facility]) with baby #3 05/30/2017     Priority: Medium   • Tobacco dependence 05/30/2017     Priority: Medium               Objective:      unknown if currently breastfeeding.    General:   alert, cooperative   Skin:   normal   HEENT:  PERRLA and EOMI   Lungs:   CTA bilateral   Heart:   S1, S2 normal, no murmur, click, rub or gallop, regular rate and rhythm   Abdomen:   gravid, NT   EFW:  3700   Pelvis:  adequate with gynecoid pelvis   FHT:  135 BPM with some accels, no decels and moderate variability   Uterine Size: S=D   Presentations: Cephalic   Cervix: Done in office by nurse midwife Abby    Dilation: 3cm    Effacement: 50%    Station:  -3    Consistency: Medium    Position: Middle     Lab Review  Lab:   Blood type: A     Recent Results (from the past 5880 hour(s))   POCT Pregnancy    Collection Time: 04/26/17  4:25 PM   Result  Value Ref Range    POC Urine Pregnancy Test Positive Negative    Internal Control Positive Positive     Internal Control Negative Negative    THINPREP RFLX HPV ASCUS W/CTNG    Collection Time: 05/30/17  2:44 PM   Result Value Ref Range    Cytology Reg See Path Report     Source Endo/Cervical     C. trachomatis by PCR POSITIVE (A) Negative    N. gonorrhoeae by PCR Negative Negative   URINE CULTURE(NEW)    Collection Time: 06/12/17  2:01 PM   Result Value Ref Range    Significant Indicator POS     Source UR     Urine Culture Escherichia coli  >100,000 cfu/mL   (A)        Susceptibility    Escherichia coli -  (no method available)     Ceftriaxone <=8 Sensitive mcg/mL     Ceftazidime <=1 Sensitive mcg/mL     Cephalothin >16 Resistant mcg/mL     Cefotaxime <=2 Sensitive mcg/mL     Ciprofloxacin <=1 Sensitive mcg/mL     Cefepime <=8 Sensitive mcg/mL     Cefuroxime <=4 Sensitive mcg/mL     Ampicillin >16 Resistant mcg/mL     Cefotetan <=16 Sensitive mcg/mL     Tobramycin <=4 Sensitive mcg/mL     Nitrofurantoin <=32 Sensitive mcg/mL     Gentamicin <=4 Sensitive mcg/mL     Levofloxacin <=2 Sensitive mcg/mL     Pip/Tazobactam >64 Resistant mcg/mL     Piperacillin >64 Resistant mcg/mL     Trimeth/Sulfa <=2/38 Sensitive mcg/mL     Tigecycline <=2 Sensitive mcg/mL   PREG CNTR PRENATAL PN    Collection Time: 06/12/17  3:12 PM   Result Value Ref Range    Micro Urine Req Microscopic     Color Yellow     Character Sl Cloudy (A)     Specific Gravity 1.009 <1.035    Ph 6.5 5.0 - 8.0    Glucose Negative Negative mg/dL    Ketones 10 (A) Negative mg/dL    Protein Negative Negative mg/dL    Bilirubin Negative Negative    Nitrite Positive (A) Negative    Leukocyte Esterase Small (A) Negative    Occult Blood Negative Negative    Culture Indicated Yes UA Culture    Rubella IgG Antibody 25.40 IU/mL    Syphilis, Treponemal Qual Non Reactive Non Reactive    Hepatitis B Surface Antigen Negative Negative    WBC 10.4 4.8 - 10.8 K/uL    RBC 4.26  4.20 - 5.40 M/uL    Hemoglobin 12.6 12.0 - 16.0 g/dL    Hematocrit 37.4 37.0 - 47.0 %    MCV 87.8 81.4 - 97.8 fL    MCH 29.6 27.0 - 33.0 pg    MCHC 33.7 33.6 - 35.0 g/dL    RDW 44.3 35.9 - 50.0 fL    Platelet Count 233 164 - 446 K/uL    MPV 11.1 9.0 - 12.9 fL    Neutrophils-Polys 74.80 (H) 44.00 - 72.00 %    Lymphocytes 16.50 (L) 22.00 - 41.00 %    Monocytes 6.40 0.00 - 13.40 %    Eosinophils 1.20 0.00 - 6.90 %    Basophils 0.50 0.00 - 1.80 %    Immature Granulocytes 0.60 0.00 - 0.90 %    Nucleated RBC 0.00 /100 WBC    Neutrophils (Absolute) 7.81 (H) 2.00 - 7.15 K/uL    Lymphs (Absolute) 1.72 1.00 - 4.80 K/uL    Monos (Absolute) 0.67 0.00 - 0.85 K/uL    Eos (Absolute) 0.12 0.00 - 0.51 K/uL    Baso (Absolute) 0.05 0.00 - 0.12 K/uL    Immature Granulocytes (abs) 0.06 0.00 - 0.11 K/uL    NRBC (Absolute) 0.00 K/uL   GLUCOSE 1HR GESTATIONAL    Collection Time: 06/12/17  3:12 PM   Result Value Ref Range    Glucose, Post Dose 101 70 - 139 mg/dL   HIV ANTIBODIES    Collection Time: 06/12/17  3:12 PM   Result Value Ref Range    HIV Ag/Ab Combo Assay Non Reactive Non Reactive   OP PRENATAL PANEL-BLOOD BANK    Collection Time: 06/12/17  3:12 PM   Result Value Ref Range    ABO Grouping Only A     Rh Grouping Only NEG     Antibody Screen Scrn NEG    URINE MICROSCOPIC (W/UA)    Collection Time: 06/12/17  3:12 PM   Result Value Ref Range    WBC 0-2 /hpf    RBC 0-2 /hpf    Bacteria Many (A) None /hpf    Epithelial Cells Few /hpf    Mucous Threads Few /hpf   HISTOLOGY REQUEST    Collection Time: 06/12/17  4:23 PM   Result Value Ref Range    Pathology Request Sent to [facility]    CHLAMYDIA/GC PCR URINE OR SWAB    Collection Time: 06/28/17  9:37 AM   Result Value Ref Range    Source Genital     C. trachomatis by PCR Negative Negative    N. gonorrhoeae by PCR Negative Negative   GRP B STREP, BY PCR (MINAYA BROTH)    Collection Time: 08/23/17  2:51 PM   Result Value Ref Range    Strep Gp B DNA PCR Negative Negative   POC UA    Collection Time:  09/03/17  7:27 PM   Result Value Ref Range    POC Color Nicole     POC Appearance Slightly Cloudy (A)     POC Glucose Negative Negative mg/dL    POC Ketones Negative Negative mg/dL    POC Specific Gravity 1.020 1.005 - 1.030    POC Blood Negative Negative    POC Urine PH 7.5 5.0 - 8.0    POC Protein Trace (A) Negative mg/dL    POC Nitrites Negative Negative    POC Leukocyte Esterase Moderate (A) Negative   URINE DRUG SCREEN    Collection Time: 09/03/17  8:04 PM   Result Value Ref Range    Amphetamines Urine Positive (A) Negative    Barbiturates Negative Negative    Benzodiazepines Negative Negative    Cocaine Metabolite Negative Negative    Methadone Negative Negative    Opiates Negative Negative    Oxycodone Negative Negative    Phencyclidine -Pcp Negative Negative    Propoxyphene Negative Negative    Cannabinoid Metab Positive (A) Negative   CBC WITH DIFFERENTIAL    Collection Time: 09/03/17  8:04 PM   Result Value Ref Range    WBC 24.0 (H) 4.8 - 10.8 K/uL    RBC 4.06 (L) 4.20 - 5.40 M/uL    Hemoglobin 11.6 (L) 12.0 - 16.0 g/dL    Hematocrit 34.5 (L) 37.0 - 47.0 %    MCV 85.0 81.4 - 97.8 fL    MCH 28.6 27.0 - 33.0 pg    MCHC 33.6 33.6 - 35.0 g/dL    RDW 43.1 35.9 - 50.0 fL    Platelet Count 191 164 - 446 K/uL    MPV 10.4 9.0 - 12.9 fL    Neutrophils-Polys 87.90 (H) 44.00 - 72.00 %    Lymphocytes 4.90 (L) 22.00 - 41.00 %    Monocytes 4.50 0.00 - 13.40 %    Eosinophils 0.40 0.00 - 6.90 %    Basophils 0.30 0.00 - 1.80 %    Immature Granulocytes 2.00 (H) 0.00 - 0.90 %    Nucleated RBC 0.00 /100 WBC    Neutrophils (Absolute) 21.05 (H) 2.00 - 7.15 K/uL    Lymphs (Absolute) 1.18 1.00 - 4.80 K/uL    Monos (Absolute) 1.08 (H) 0.00 - 0.85 K/uL    Eos (Absolute) 0.09 0.00 - 0.51 K/uL    Baso (Absolute) 0.08 0.00 - 0.12 K/uL    Immature Granulocytes (abs) 0.47 (H) 0.00 - 0.11 K/uL    NRBC (Absolute) 0.00 K/uL   COMP METABOLIC PANEL    Collection Time: 09/03/17  8:04 PM   Result Value Ref Range    Sodium 134 (L) 135 - 145  mmol/L    Potassium 3.7 3.6 - 5.5 mmol/L    Chloride 106 96 - 112 mmol/L    Co2 19 (L) 20 - 33 mmol/L    Anion Gap 9.0 0.0 - 11.9    Glucose 87 65 - 99 mg/dL    Bun 6 (L) 8 - 22 mg/dL    Creatinine 0.48 (L) 0.50 - 1.40 mg/dL    Calcium 8.9 8.5 - 10.5 mg/dL    AST(SGOT) 16 12 - 45 U/L    ALT(SGPT) 14 2 - 50 U/L    Alkaline Phosphatase 199 (H) 30 - 99 U/L    Total Bilirubin 0.4 0.1 - 1.5 mg/dL    Albumin 3.1 (L) 3.2 - 4.9 g/dL    Total Protein 6.1 6.0 - 8.2 g/dL    Globulin 3.0 1.9 - 3.5 g/dL    A-G Ratio 1.0 g/dL   URIC ACID    Collection Time: 09/03/17  8:04 PM   Result Value Ref Range    Uric Acid 4.4 1.9 - 8.2 mg/dL   ESTIMATED GFR    Collection Time: 09/03/17  8:04 PM   Result Value Ref Range    GFR If African American >60 >60 mL/min/1.73 m 2    GFR If Non African American >60 >60 mL/min/1.73 m 2   POCT Urinalysis    Collection Time: 09/07/17  2:36 PM   Result Value Ref Range    POC Color  Negative    POC Appearance  Negative    POC Leukocyte Esterase MODERATE Negative    POC Nitrites POSITIVE Negative    POC Urobiligen  Negative (0.2) mg/dL    POC Protein 300 Negative mg/dL    POC Urine PH 6.5 5.0 - 8.0    POC Blood MODERATE Negative    POC Specific Gravity 1.020 <1.005 - >1.030    POC Ketones NEGATIVE Negative mg/dL    POC Biliruben  Negative mg/dL    POC Glucose NEGATIVE Negative mg/dL        Assessment:   Tiffany Shepherd at 38w1d  Labor status: Not in labor.  Obstetrical history significant for   Patient Active Problem List    Diagnosis Date Noted   • Rh negative, antepartum - Rhogam given 6/28/17 06/16/2017     Priority: High   • Pregnancy with adoption planned 05/30/2017     Priority: High   • HSIL on Pap smear of cervix 06/02/2017     Priority: Medium   • Diabetes in pregnancy (CMS-[facility]) with baby #3 05/30/2017     Priority: Medium   • Tobacco dependence 05/30/2017     Priority: Medium   .    Patient blood type A- and per notes received Rhogam 6/28/17  Elevated pressures, headaches/blurry vision  and 3+ proteinuria could be indicative of preeclampsia, exacerbated by methamphetamine use. Baby will be adopted out.   Plan:     Admit to L&D for induction of labor for suspected preeclampsia  GBS negative  Pitocin for induction  Mg2+ 4g loading dose followed by 2g/hr continuous  Hypertensive protocol in place  CBC, CMP, and uric acid   Utox

## 2017-09-08 LAB
IMMUNE ROSETTING TEST 8505FMH: NORMAL
MAGNESIUM SERPL-MCNC: 4.2 MG/DL (ref 1.5–2.5)
MAGNESIUM SERPL-MCNC: 4.4 MG/DL (ref 1.5–2.5)
MAGNESIUM SERPL-MCNC: 4.5 MG/DL (ref 1.5–2.5)
MAGNESIUM SERPL-MCNC: 4.5 MG/DL (ref 1.5–2.5)
NUMBER OF RH DOSES IND 8505RD: 1

## 2017-09-08 PROCEDURE — A9270 NON-COVERED ITEM OR SERVICE: HCPCS | Performed by: STUDENT IN AN ORGANIZED HEALTH CARE EDUCATION/TRAINING PROGRAM

## 2017-09-08 PROCEDURE — 59409 OBSTETRICAL CARE: CPT

## 2017-09-08 PROCEDURE — 83735 ASSAY OF MAGNESIUM: CPT | Mod: 91

## 2017-09-08 PROCEDURE — 304965 HCHG RECOVERY SERVICES

## 2017-09-08 PROCEDURE — 10H07YZ INSERTION OF OTHER DEVICE INTO PRODUCTS OF CONCEPTION, VIA NATURAL OR ARTIFICIAL OPENING: ICD-10-PCS | Performed by: OBSTETRICS & GYNECOLOGY

## 2017-09-08 PROCEDURE — 10907ZC DRAINAGE OF AMNIOTIC FLUID, THERAPEUTIC FROM PRODUCTS OF CONCEPTION, VIA NATURAL OR ARTIFICIAL OPENING: ICD-10-PCS | Performed by: OBSTETRICS & GYNECOLOGY

## 2017-09-08 PROCEDURE — 4A1H7CZ MONITORING OF PRODUCTS OF CONCEPTION, CARDIAC RATE, VIA NATURAL OR ARTIFICIAL OPENING: ICD-10-PCS | Performed by: OBSTETRICS & GYNECOLOGY

## 2017-09-08 PROCEDURE — 700105 HCHG RX REV CODE 258: Performed by: STUDENT IN AN ORGANIZED HEALTH CARE EDUCATION/TRAINING PROGRAM

## 2017-09-08 PROCEDURE — 700102 HCHG RX REV CODE 250 W/ 637 OVERRIDE(OP): Performed by: STUDENT IN AN ORGANIZED HEALTH CARE EDUCATION/TRAINING PROGRAM

## 2017-09-08 PROCEDURE — 303615 HCHG EPIDURAL/SPINAL ANESTHESIA FOR LABOR

## 2017-09-08 PROCEDURE — 36415 COLL VENOUS BLD VENIPUNCTURE: CPT

## 2017-09-08 PROCEDURE — 700111 HCHG RX REV CODE 636 W/ 250 OVERRIDE (IP)

## 2017-09-08 PROCEDURE — 85461 HEMOGLOBIN FETAL: CPT

## 2017-09-08 PROCEDURE — 700111 HCHG RX REV CODE 636 W/ 250 OVERRIDE (IP): Performed by: STUDENT IN AN ORGANIZED HEALTH CARE EDUCATION/TRAINING PROGRAM

## 2017-09-08 PROCEDURE — 700111 HCHG RX REV CODE 636 W/ 250 OVERRIDE (IP): Performed by: NURSE PRACTITIONER

## 2017-09-08 PROCEDURE — 770002 HCHG ROOM/CARE - OB PRIVATE (112)

## 2017-09-08 PROCEDURE — A9270 NON-COVERED ITEM OR SERVICE: HCPCS | Performed by: NURSE PRACTITIONER

## 2017-09-08 PROCEDURE — 700105 HCHG RX REV CODE 258

## 2017-09-08 PROCEDURE — 700105 HCHG RX REV CODE 258: Performed by: NURSE PRACTITIONER

## 2017-09-08 PROCEDURE — 700102 HCHG RX REV CODE 250 W/ 637 OVERRIDE(OP): Performed by: NURSE PRACTITIONER

## 2017-09-08 RX ORDER — MAGNESIUM SULFATE HEPTAHYDRATE 40 MG/ML
2 INJECTION, SOLUTION INTRAVENOUS CONTINUOUS
Status: DISCONTINUED | OUTPATIENT
Start: 2017-09-08 | End: 2017-09-09

## 2017-09-08 RX ORDER — OXYCODONE AND ACETAMINOPHEN 10; 325 MG/1; MG/1
1 TABLET ORAL EVERY 4 HOURS PRN
Status: DISCONTINUED | OUTPATIENT
Start: 2017-09-08 | End: 2017-09-09 | Stop reason: HOSPADM

## 2017-09-08 RX ORDER — SODIUM CHLORIDE, SODIUM LACTATE, POTASSIUM CHLORIDE, CALCIUM CHLORIDE 600; 310; 30; 20 MG/100ML; MG/100ML; MG/100ML; MG/100ML
INJECTION, SOLUTION INTRAVENOUS CONTINUOUS
Status: DISCONTINUED | OUTPATIENT
Start: 2017-09-08 | End: 2017-09-09

## 2017-09-08 RX ORDER — ACETAMINOPHEN 325 MG/1
650 TABLET ORAL EVERY 4 HOURS PRN
Status: DISCONTINUED | OUTPATIENT
Start: 2017-09-08 | End: 2017-09-09 | Stop reason: HOSPADM

## 2017-09-08 RX ORDER — SODIUM CHLORIDE, SODIUM LACTATE, POTASSIUM CHLORIDE, CALCIUM CHLORIDE 600; 310; 30; 20 MG/100ML; MG/100ML; MG/100ML; MG/100ML
INJECTION, SOLUTION INTRAVENOUS
Status: COMPLETED
Start: 2017-09-08 | End: 2017-09-08

## 2017-09-08 RX ORDER — MISOPROSTOL 200 UG/1
TABLET ORAL
Status: ACTIVE
Start: 2017-09-08 | End: 2017-09-08

## 2017-09-08 RX ORDER — ROPIVACAINE HYDROCHLORIDE 2 MG/ML
INJECTION, SOLUTION EPIDURAL; INFILTRATION; PERINEURAL
Status: COMPLETED
Start: 2017-09-08 | End: 2017-09-08

## 2017-09-08 RX ORDER — NICOTINE 21 MG/24HR
14 PATCH, TRANSDERMAL 24 HOURS TRANSDERMAL
Status: DISCONTINUED | OUTPATIENT
Start: 2017-09-08 | End: 2017-09-09 | Stop reason: HOSPADM

## 2017-09-08 RX ORDER — ACETAMINOPHEN 325 MG/1
325 TABLET ORAL EVERY 4 HOURS PRN
Status: DISCONTINUED | OUTPATIENT
Start: 2017-09-08 | End: 2017-09-09 | Stop reason: HOSPADM

## 2017-09-08 RX ORDER — ONDANSETRON 2 MG/ML
4 INJECTION INTRAMUSCULAR; INTRAVENOUS EVERY 6 HOURS PRN
Status: DISCONTINUED | OUTPATIENT
Start: 2017-09-08 | End: 2017-09-09

## 2017-09-08 RX ORDER — IBUPROFEN 800 MG/1
800 TABLET ORAL EVERY 8 HOURS PRN
Status: DISCONTINUED | OUTPATIENT
Start: 2017-09-08 | End: 2017-09-09 | Stop reason: HOSPADM

## 2017-09-08 RX ORDER — OXYCODONE HYDROCHLORIDE AND ACETAMINOPHEN 5; 325 MG/1; MG/1
1 TABLET ORAL EVERY 4 HOURS PRN
Status: DISCONTINUED | OUTPATIENT
Start: 2017-09-08 | End: 2017-09-09 | Stop reason: HOSPADM

## 2017-09-08 RX ORDER — CALCIUM GLUCONATE 94 MG/ML
1 INJECTION, SOLUTION INTRAVENOUS PRN
Status: DISCONTINUED | OUTPATIENT
Start: 2017-09-08 | End: 2017-09-09

## 2017-09-08 RX ADMIN — OXYTOCIN 125 ML/HR: 10 INJECTION, SOLUTION INTRAMUSCULAR; INTRAVENOUS at 08:29

## 2017-09-08 RX ADMIN — MAGNESIUM SULFATE IN WATER 2 G/HR: 40 INJECTION, SOLUTION INTRAVENOUS at 03:16

## 2017-09-08 RX ADMIN — ACETAMINOPHEN 650 MG: 325 TABLET, FILM COATED ORAL at 05:41

## 2017-09-08 RX ADMIN — IBUPROFEN 800 MG: 800 TABLET, FILM COATED ORAL at 08:30

## 2017-09-08 RX ADMIN — MISOPROSTOL 800 MCG: 200 TABLET ORAL at 06:55

## 2017-09-08 RX ADMIN — CEFAZOLIN SODIUM 1 G: 1 INJECTION, SOLUTION INTRAVENOUS at 21:13

## 2017-09-08 RX ADMIN — MAGNESIUM SULFATE IN WATER 2 G/HR: 40 INJECTION, SOLUTION INTRAVENOUS at 14:05

## 2017-09-08 RX ADMIN — SODIUM CHLORIDE, POTASSIUM CHLORIDE, SODIUM LACTATE AND CALCIUM CHLORIDE: 600; 310; 30; 20 INJECTION, SOLUTION INTRAVENOUS at 02:20

## 2017-09-08 RX ADMIN — NICOTINE 14 MG: 14 PATCH, EXTENDED RELEASE TRANSDERMAL at 17:12

## 2017-09-08 RX ADMIN — ROPIVACAINE HYDROCHLORIDE 100 ML: 2 INJECTION, SOLUTION EPIDURAL; INFILTRATION at 02:46

## 2017-09-08 RX ADMIN — CEFAZOLIN SODIUM 1 G: 1 INJECTION, SOLUTION INTRAVENOUS at 08:29

## 2017-09-08 RX ADMIN — SODIUM CHLORIDE, POTASSIUM CHLORIDE, SODIUM LACTATE AND CALCIUM CHLORIDE: 600; 310; 30; 20 INJECTION, SOLUTION INTRAVENOUS at 23:30

## 2017-09-08 RX ADMIN — IBUPROFEN 800 MG: 800 TABLET, FILM COATED ORAL at 18:19

## 2017-09-08 RX ADMIN — SODIUM CHLORIDE, POTASSIUM CHLORIDE, SODIUM LACTATE AND CALCIUM CHLORIDE 1000 ML: 600; 310; 30; 20 INJECTION, SOLUTION INTRAVENOUS at 21:11

## 2017-09-08 ASSESSMENT — PAIN SCALES - GENERAL
PAINLEVEL_OUTOF10: 0
PAINLEVEL_OUTOF10: 5
PAINLEVEL_OUTOF10: 1

## 2017-09-08 NOTE — CARE PLAN
Problem: Altered physiologic condition related to immediate post-delivery state and potential for bleeding/hemorrhage  Goal: Patient physiologically stable as evidenced by normal lochia, palpable uterine involution and vital signs within normal limits  Outcome: PROGRESSING AS EXPECTED

## 2017-09-08 NOTE — PROGRESS NOTES
S: Pt is uncomfortable with CTXs, reports pain with catheter. Does not tolerate SVE, unable to perform amniotomy. After discussion, patient agreeable to epidural.      O:    Vitals:    09/08/17 0003 09/08/17 0006 09/08/17 0010 09/08/17 0011   BP:       Pulse: 72 71 85 77   Temp:       TempSrc:       SpO2: 94% 94% 94% 97%   Weight:       Height:               FHTs:  Baseline 125, + accels, - decels, moderate variability        [place]: Contractions q 3 minutes, firm to palpation, pitocin @ 18 milliunits        SVE: 4-5/th/-2     A/P:  1.  IUP @ 38w2d            2.  Cat 1 FHTs             3.  Epidural             4.  Plan for amniotomy and IUPC placement when patient comfortable    Madeline Humphreys CNM, APRN

## 2017-09-08 NOTE — CARE PLAN
Problem: Pain  Goal: Alleviation of Pain or a reduction in pain to the patient's comfort goal    Intervention: Pain Management - Epidural/Spinal  Patient would like an epidural when she becomes uncomfortable. Discussed procedure and risks/benefits. Patient verbalizes understanding.      Problem: Risk for Infection, Impaired Wound Healing  Goal: Remain free from signs and symptoms of infection    Intervention: Infection prevention measures  RN educates patient and guests on importance of hand washing and performs hand hygiene in  And out of room.

## 2017-09-08 NOTE — PROGRESS NOTES
0700  Report from NOC RN in room with pt and YAAKOV Humphreys in room performing fundal massage at this time.  Pt given cytotec by Petr and bleeding decreased at this time.  0720  Breakfast tray ordered for pt and pt resting at this time.  0800  Griffith replaced at this time and FF with light lochia.  Epidural turned off at this time and breakfast tray given to pt.  0830  PO medications given for pain, FF with light lochia.  Pt desires to sleep more.  0930  Pt continues to rest at this time, wakeful as RN enters the room, pt continues to have FF with light lochia.  1015  In with pt and pericare performed.  Pt reports that her belly is sore since the delivery.  Pt transferred to W/C and transferred to PP.  1100  Report to PP RN in room with pt at this time who is resting and responds to add a few responses to the report to the next nurse.

## 2017-09-08 NOTE — CARE PLAN
Problem: Potential for postpartum infection related to presence of episiotomy/vaginal tear and/or uterine contamination  Goal: Patient will be absent from signs and symptoms of infection  Outcome: PROGRESSING AS EXPECTED

## 2017-09-08 NOTE — DISCHARGE PLANNING
:     Referral: Adoption and history of drug use.     Intervention:  Reviewed medical record and met with MOB and adoptive parents: Khai and Joe Mcbrideerra who are here from [place].  The adoptive mother is banded and the adoptive FOB's ID is on the chart.       Birth mother signed the release of medical information and consent for infant to discharge to [facility] ([phone]) once medically cleared.       Birth mother is working with [facility] and the Social Workers are Violeta Ghosh ([phone]) and Julisa Segura (([phone]).   will fax records to [phone].     MOB has a history of drug use and her tox is positive for meth and marijuana.  Infant's tox is pending.  MOB has been flagged by CPS.  A report was made to Drea Ayon.  Report is Information Only.  If she changes her mind and wants to keep the baby, [facility] needs to be notified.       Plan:  [facility] medically cleared.

## 2017-09-08 NOTE — PROGRESS NOTES
S: Pt is comfortable with epidural.      O:    Vitals:    09/08/17 0313 09/08/17 0316 09/08/17 0317 09/08/17 0322   BP: 120/65  123/61 124/60   Pulse: 72 69 70 66   Temp:       TempSrc:       SpO2:  97%     Weight:       Height:               FHTs:  Baseline 130, + accels, - decels, moderate variability        [place]: Contractions q 2-3 minutes, moderate to palpation, pitocin @ 18 milliunits        SVE: 6/70/-1     A/P:  1.  IUP @ 38w2d            2.  Cat 1 FHTs             3.  Amniotomy clear             4.  IUPC placement            5. Continue pitocin    Madeline Humphreys, JENNAM, APRN

## 2017-09-08 NOTE — PROGRESS NOTES
1900- Received report from MANJULA Jovel RN. Patient has Joe at bedside, who will be adopting this baby. Patient tested positive for meth and cannabis. Patient and Joe state that they have gone through an adoption agency. Patient is tearful when talking about her meth use during this pregnancy but states that she kept using because when she stopped with her other baby during that pregnancy, that baby's birth weight was low. RN spent time discussing the patient's drug use history and the consequences of that on this baby. Magnesium running, BPs stable at this time. Pitocin running, patient not feeling any UCs but would like an epidural when she gets uncomfortable. Joe is supportive at the bedside.     2000- YAAKOV Humphreys to bedside. SVE as noted. Continue on pitocin.    2300- APRN at bedside. SVE as noted. No change. Patient sleeping, denies feeling any UCs. Continue on pitocin before AROM.    0200- SVE as noted. Minimal change. Patient on 18 mu of pitocin and starting to get uncomfortable. Still too high to safely AROM. Plan for epidural.     0230- Dr. Rowe to bedside. Epidural placed without complications.     0300- Patient comfortable and on peanut ball.     0345- YAAKOV Humphreys to bedside. SVE as noted. AROM=clear fluid. IUPC placed.     0500- SVE as noted. Patient states she feels urge to push and lots of pressure. Loss on control. Patient being coached through breathing technique.     0525- SVE as noted. RN at bedside for loss of control.     0615- SVE- complete. Petr to bedside.     0622- Delivery of viable baby boy apgars 8,9. Nuchal x1 reduced. No lacerations. 450 EBL. 800 cytotec placed by provider. Baby skin to skin with adoptive mother. Patient appropriate and seems to be coping well.    0650- Manual removal of placenta intact condition. Antibiotics ordered prophylactically. Report to LUCAS Rivas RN. POC discussed.

## 2017-09-08 NOTE — PROCEDURES
SPONTANEOUS VAGINAL DELIVERY PROCEDURE NOTE    PATIENT ID:  NAME:  Tiffany Shepherd  MRN:               [id number]  YOB: 1985    Labor Course: Patient admitted for elevated blood pressures to 175/102 and urine dip 3+ protein in office. Used meth throughout the pregnancy most recently last night as well as marijuana (last use this morning) and half pack per day cigarette smoking. Patient planning to adopt out, adoptive mother here. Patient started on magnesium sulfate 2 gm/hr and pitocin begun. Labor progressed slowly throughout day. Patient not coping with exams. Epidural placed and amniotomy was performed with clear fluid. IUPC placed and pitocin continued. Labor progressed rapidly at that point on until complete and pushing efforts were effective.    On 2017  at 06:22, this 32 y.o., now  38w2d , GBS negative female delivered via  under epidural anesthesia a viable male infant weight with APGAR scores of 8 and 9 at one and five minutes. The head delivered in MARIA VICTORIA, there was a single nuchal cord which was reduced and rest of shoulders and body delivered uneventfully over an intact perineum. Cord was doubly clamped, cut by adoptive mom and infant handed to RN in attendance. An intact placenta was delivered manually at 06:50 with 3 vessel cord. Cytotec 800 mcg given vaginally for heavy bleeding. Estimated blood loss was 450cc. Magnesium will continue to 24 hrs and ancef 1 gm ordered. Patient will be transferred to postpartum in stable condition and infant to  nursery.      Delivery attended by Madeline Humphreys, ZAYNAB, APRN.

## 2017-09-08 NOTE — PROGRESS NOTES
Pt admitted and started on magnisium for PIH. Pt settled in for the night will report off to Noc shift [id number] for cont pt care.

## 2017-09-08 NOTE — PROGRESS NOTES
1100 pt arrived to unit via w/c with infant and adoptive mother Joe.  Report from Valerie WALKER. Assumed care of pt. Assessment wnl. Magnesium sulfate infusing at 2 grams/ 50cc per hour. LR with pitocin infusing at 75cc hour on separate pumps. Griffith draining clear yellow urine. Oriented to room and unit. Call bell in reach.

## 2017-09-08 NOTE — PROGRESS NOTES
S: Pt is not feeling contractions.      O:    Vitals:    17 1902 17   BP: 150/92 145/78 140/78 148/89   Pulse: 76 69 71 76   Temp:    36.3 °C (97.4 °F)   TempSrc:    Temporal   SpO2:  98%     Weight:       Height:               FHTs:  Baseline 120, + accels, - decels, moderate variability        [place]: Contractions q 7-10 minutes, mild to palpation, pitocin @ 4 milliunits        SVE: 3-4//-2     A/P:  1.  IUP @ 38w1d            2.  Cat 1 FHTs             3.  Continue with Pitocin              4.  Anticipate     Madeline Humphreys CNM, APRN

## 2017-09-09 VITALS
BODY MASS INDEX: 34.15 KG/M2 | HEIGHT: 64 IN | WEIGHT: 200 LBS | HEART RATE: 82 BPM | SYSTOLIC BLOOD PRESSURE: 173 MMHG | OXYGEN SATURATION: 95 % | TEMPERATURE: 99.4 F | RESPIRATION RATE: 18 BRPM | DIASTOLIC BLOOD PRESSURE: 98 MMHG

## 2017-09-09 LAB
ACTION RH IMMUNE GLOB 8505RHG: NORMAL
BASOPHILS # BLD AUTO: 0.2 % (ref 0–1.8)
BASOPHILS # BLD: 0.03 K/UL (ref 0–0.12)
EOSINOPHIL # BLD AUTO: 0.09 K/UL (ref 0–0.51)
EOSINOPHIL NFR BLD: 0.7 % (ref 0–6.9)
ERYTHROCYTE [DISTWIDTH] IN BLOOD BY AUTOMATED COUNT: 45.1 FL (ref 35.9–50)
HCT VFR BLD AUTO: 27.7 % (ref 37–47)
HGB BLD-MCNC: 9.4 G/DL (ref 12–16)
IMM GRANULOCYTES # BLD AUTO: 0.32 K/UL (ref 0–0.11)
IMM GRANULOCYTES NFR BLD AUTO: 2.5 % (ref 0–0.9)
LYMPHOCYTES # BLD AUTO: 1.99 K/UL (ref 1–4.8)
LYMPHOCYTES NFR BLD: 15.8 % (ref 22–41)
MAGNESIUM SERPL-MCNC: 4.8 MG/DL (ref 1.5–2.5)
MCH RBC QN AUTO: 29.4 PG (ref 27–33)
MCHC RBC AUTO-ENTMCNC: 33.9 G/DL (ref 33.6–35)
MCV RBC AUTO: 86.6 FL (ref 81.4–97.8)
MONOCYTES # BLD AUTO: 0.63 K/UL (ref 0–0.85)
MONOCYTES NFR BLD AUTO: 5 % (ref 0–13.4)
NEUTROPHILS # BLD AUTO: 9.52 K/UL (ref 2–7.15)
NEUTROPHILS NFR BLD: 75.8 % (ref 44–72)
NRBC # BLD AUTO: 0 K/UL
NRBC BLD AUTO-RTO: 0 /100 WBC
PLATELET # BLD AUTO: 172 K/UL (ref 164–446)
PMV BLD AUTO: 9.8 FL (ref 9–12.9)
RBC # BLD AUTO: 3.2 M/UL (ref 4.2–5.4)
WBC # BLD AUTO: 12.6 K/UL (ref 4.8–10.8)

## 2017-09-09 PROCEDURE — 36415 COLL VENOUS BLD VENIPUNCTURE: CPT

## 2017-09-09 PROCEDURE — 700111 HCHG RX REV CODE 636 W/ 250 OVERRIDE (IP): Performed by: NURSE PRACTITIONER

## 2017-09-09 PROCEDURE — A9270 NON-COVERED ITEM OR SERVICE: HCPCS | Performed by: STUDENT IN AN ORGANIZED HEALTH CARE EDUCATION/TRAINING PROGRAM

## 2017-09-09 PROCEDURE — 83735 ASSAY OF MAGNESIUM: CPT

## 2017-09-09 PROCEDURE — 700102 HCHG RX REV CODE 250 W/ 637 OVERRIDE(OP): Performed by: STUDENT IN AN ORGANIZED HEALTH CARE EDUCATION/TRAINING PROGRAM

## 2017-09-09 PROCEDURE — 85025 COMPLETE CBC W/AUTO DIFF WBC: CPT

## 2017-09-09 RX ORDER — SODIUM CHLORIDE, SODIUM LACTATE, POTASSIUM CHLORIDE, CALCIUM CHLORIDE 600; 310; 30; 20 MG/100ML; MG/100ML; MG/100ML; MG/100ML
INJECTION, SOLUTION INTRAVENOUS PRN
Status: DISCONTINUED | OUTPATIENT
Start: 2017-09-09 | End: 2017-09-09 | Stop reason: HOSPADM

## 2017-09-09 RX ORDER — BISACODYL 10 MG
10 SUPPOSITORY, RECTAL RECTAL PRN
Status: DISCONTINUED | OUTPATIENT
Start: 2017-09-09 | End: 2017-09-09 | Stop reason: HOSPADM

## 2017-09-09 RX ORDER — DOCUSATE SODIUM 100 MG/1
100 CAPSULE, LIQUID FILLED ORAL 2 TIMES DAILY PRN
Status: DISCONTINUED | OUTPATIENT
Start: 2017-09-09 | End: 2017-09-09 | Stop reason: HOSPADM

## 2017-09-09 RX ORDER — MISOPROSTOL 200 UG/1
600 TABLET ORAL
Status: DISCONTINUED | OUTPATIENT
Start: 2017-09-09 | End: 2017-09-09 | Stop reason: HOSPADM

## 2017-09-09 RX ADMIN — IBUPROFEN 800 MG: 800 TABLET, FILM COATED ORAL at 11:44

## 2017-09-09 RX ADMIN — MAGNESIUM SULFATE IN WATER 2 G/HR: 40 INJECTION, SOLUTION INTRAVENOUS at 00:08

## 2017-09-09 RX ADMIN — IBUPROFEN 800 MG: 800 TABLET, FILM COATED ORAL at 03:45

## 2017-09-09 RX ADMIN — CEFAZOLIN SODIUM 1 G: 1 INJECTION, SOLUTION INTRAVENOUS at 08:47

## 2017-09-09 ASSESSMENT — PAIN SCALES - GENERAL
PAINLEVEL_OUTOF10: 3
PAINLEVEL_OUTOF10: 5
PAINLEVEL_OUTOF10: 0

## 2017-09-09 NOTE — CARE PLAN
Problem: Altered physiologic condition related to immediate post-delivery state and potential for bleeding/hemorrhage  Goal: Patient physiologically stable as evidenced by normal lochia, palpable uterine involution and vital signs within normal limits  Outcome: PROGRESSING AS EXPECTED  Pt's vs stable, fundus is firm and light lochia. Will continue to monitor.    Problem: Alteration in comfort related to episiotomy, vaginal repair and/or after birth pains  Goal: Patient verbalizes acceptable pain level  Outcome: PROGRESSING AS EXPECTED  Pt verbalizes acceptable pain level and relief with PO meds. Encouraged to call for pain medications as needed.

## 2017-09-09 NOTE — PROGRESS NOTES
Dr Pacheco notified that pt wanted to leave at this moment and that pt refused to wait for AMA paper to sign-pt instructed to wait for MD and pt refused to wait and left with all belongings despite education on risks of high bp.

## 2017-09-09 NOTE — PROGRESS NOTES
Post Partum Progress Note    Name:   Tiffany Shepherd   Date/Time:  9/9/2017 - 7:03 AM  Chief Admitting Dx:  pregnancy  Supervision of normal IUP (intrauterine pregnancy) in multigravida  Delivery Type:  vaginal, spontaneous  Post-Op/Post Partum Days #:  1    Subjective:  Abdominal pain: yes  Ambulating:   yes  Tolerating liquids:  yes  Tolerating food:  yes common adult  Flatus:   yes  BM:    no  Bleeding:   with a small amount of bleeding  Voiding:   yes  Dizziness:   no  Feeding:   Adopt out    Vitals:    09/09/17 0300 09/09/17 0400 09/09/17 0500 09/09/17 0600   BP: 145/97 136/87 141/79 135/77   Pulse: 80 76 68 71   Resp: 18 16 18 16   Temp:  36.7 °C (98 °F)  36.9 °C (98.5 °F)   TempSrc:       SpO2: 96% 96% 95% 96%   Weight:       Height:           Exam:  Breast: No engorgement  Abdomen: Abdomen soft, non-tender. BS normal. No masses,  No organomegaly  Fundal Tenderness:  no  Fundus Firm: yes  Incision: none  Below umbilicus: yes  Perineum: perineum intact  Lochia: mild  Extremities: Normal extremities, peripheral pulses and reflexes normal, no edema, redness or tenderness in the calves or thighs    Meds:  Current Facility-Administered Medications   Medication Dose   • ibuprofen (MOTRIN) tablet 800 mg  800 mg   • acetaminophen (TYLENOL) tablet 325 mg  325 mg   • oxycodone-acetaminophen (PERCOCET) 5-325 MG per tablet 1 Tab  1 Tab   • oxycodone-acetaminophen (PERCOCET-10)  MG per tablet 1 Tab  1 Tab   • acetaminophen (TYLENOL) tablet 650 mg  650 mg   • ceFAZolin (ANCEF) IVPB premix 1 g  1 g   • nicotine (NICODERM) 14 MG/24HR 14 mg  14 mg   • ondansetron (ZOFRAN ODT) dispertab 4 mg  4 mg    Or   • ondansetron (ZOFRAN) syringe/vial injection 4 mg  4 mg   • oxytocin (PITOCIN) infusion (for postpartum)   mL/hr   • magnesium sulfate 20 g/500mL infusion  2 g/hr       Labs:   Recent Labs      09/07/17   1730   WBC  14.6*   RBC  3.86*   HEMOGLOBIN  11.1*   HEMATOCRIT  32.7*   MCV  84.7   MCH  28.8    MCHC  33.9   RDW  43.4   PLATELETCT  187   MPV  10.9       Assessment:  Chief Admitting Dx:  pregnancy  Supervision of normal IUP (intrauterine pregnancy) in multigravida  Delivery Type:  vaginal, spontaneous  Tubal Ligation:  no    Plan:  Continue routine post partum care.  Anticipate DC home tomorrow as magnesium was just DC'ed.  Order CBC stat      MEGA Jj.[email or web address].

## 2017-09-10 NOTE — PROGRESS NOTES
4993 Dr Connie Main notified about baby's positive testing and said she will notify outpatient to re test mother.

## 2017-09-10 NOTE — PROGRESS NOTES
1694 Dr mathis requested to notify [facility] on call to notify patient of babys positve Hepatitis B surface antigen.

## 2017-09-14 ENCOUNTER — TELEPHONE (OUTPATIENT)
Dept: OBGYN | Facility: CLINIC | Age: 32
End: 2017-09-14

## 2017-09-14 NOTE — TELEPHONE ENCOUNTER
Pt calling stating that yesterday she was getting out of her husbands car and that the car was a little high so she had to jump down and felt a sharp pain in her lower stomach afterwards. Pt states she woke up this morning and felt pain in her stomach and had diarrhea. Consulted with midwife Galina and informed the pt that after reading her report from her delivery that she did not have lacerations and that the pain could be caused from her jumping down while getting out of the car. I also let the pt know that if she has heavy bleeding or if her pain gets worse she needs to report to the emergency room. Pt verbalized understating and states no other concerns.

## 2017-10-13 ENCOUNTER — POST PARTUM (OUTPATIENT)
Dept: OBGYN | Facility: CLINIC | Age: 32
End: 2017-10-13

## 2017-10-13 VITALS
HEIGHT: 64 IN | DIASTOLIC BLOOD PRESSURE: 76 MMHG | WEIGHT: 174 LBS | SYSTOLIC BLOOD PRESSURE: 128 MMHG | BODY MASS INDEX: 29.71 KG/M2

## 2017-10-13 DIAGNOSIS — Z34.90 PREGNANCY WITH ADOPTION PLANNED, ANTEPARTUM: ICD-10-CM

## 2017-10-13 DIAGNOSIS — Z67.91 RH NEGATIVE, ANTEPARTUM: ICD-10-CM

## 2017-10-13 DIAGNOSIS — O26.899 RH NEGATIVE, ANTEPARTUM: ICD-10-CM

## 2017-10-13 PROCEDURE — 90050 PR POSTPARTUM VISIT: CPT | Performed by: NURSE PRACTITIONER

## 2017-10-13 RX ORDER — NORGESTIMATE AND ETHINYL ESTRADIOL 7DAYSX3 28
1 KIT ORAL DAILY
Qty: 28 TAB | Refills: 6 | Status: SHIPPED | OUTPATIENT
Start: 2017-10-13 | End: 2024-03-12

## 2017-10-13 ASSESSMENT — ENCOUNTER SYMPTOMS
MUSCULOSKELETAL NEGATIVE: 1
RESPIRATORY NEGATIVE: 1
CARDIOVASCULAR NEGATIVE: 1
CONSTITUTIONAL NEGATIVE: 1
PSYCHIATRIC NEGATIVE: 1
GASTROINTESTINAL NEGATIVE: 1
NEUROLOGICAL NEGATIVE: 1
EYES NEGATIVE: 1

## 2017-10-13 NOTE — NON-PROVIDER
Pt here today for postpartum exam.  Delivery type: vaginal delivery 9/8/17  Currently :baby adopted  Desired BCM: IUD (Paragard)   LMP: n/a  Last pap: 5/30/17.abnormal. Had colposcopy. Needs f/u colposcopy 6 weeks after delivery  Phone # [phone]

## 2017-10-13 NOTE — PATIENT INSTRUCTIONS
- Education on screening v. Diagnostic testing for abnormal Paps  - Colposcopy and IUD consultation after medicaid becomes active, pt applied last week and believes it will be processed soon; informed on how to make appt at health dept. If it ends up that her medicaid does not become active for colpo follow up and IUD consult; pap/colpo records given to pt  - Rx ortho tri cycline lo pills for contraception; follow up with [facility] for IUD  - Resumption of sexual activity: safe sex precautions given  - Counseling on nutrition, adequate hydration, and exercise   - Kegel Exercises for pelvic floor/prevention of urinary incontinence   - Continue PNV for breastfeeding mother  - Counseling on PAP guidelines re: HGSIL on pap; Colpo due ASAP   - Warning s/sx of postpartum infection, depression, preeclampsia   - RTC w/ active medicaid for Colpo and possible IUD consult

## 2017-10-13 NOTE — PROGRESS NOTES
Subjective:      Tiffany Shepherd is a 32 y.o. female who presents with No chief complaint on file.            S   31 y/o now  s/p  on 17 of baby boy without complications. No laceration. Now 6 weeks postpartum. Prenatal course significant for drug use (meth, marijuana) and tobacco use in pregnancy . Postpartum course without any complications. Feeling well, denies any severe mood swings or s/sx of postpartum depression. Baby was adopted out. Has resumed sexual activity with a condom.  Mother reports no issues with bowel or bladder routine, continued regular diet. Bleeding since birth has subsided at this time with no return to menses.  No vaginal pain/odor/itching, fever, headaches, dizziness/SOB or dysuria. Desires Paragard for contraception. Pt is very resistant about following up Colpo as she doesn't want to know if she is going to die from cancer, doesn't understand why she would have to experience more pain from the procedure.        O  See PE: Physical exam today with no abnormal findings  Vital signs WNL: BP and weight  H/H: 12.6>7.4/27.7<172  PAP: HGSIL w/ unsuccessful colpo due to pregnancy; pt needs postpartum colpo      A  Reassuring exam of lactating postpartum woman s/p  on 17      P  - Education on screening v. Diagnostic testing for abnormal Paps  - Colposcopy and IUD consultation after medicaid becomes active, pt applied last week and believes it will be processed soon; informed on how to make appt at health dept. If it ends up that her medicaid does not become active for colpo follow up and IUD consult; pap/colpo records given to pt  - Rx ortho tri cycline lo pills for contraception; follow up with [facility] for IUD  - Resumption of sexual activity: safe sex precautions given  - Counseling on nutrition, adequate hydration, and exercise   - Kegel Exercises for pelvic floor/prevention of urinary incontinence   - Continue PNV for breastfeeding mother  - Counseling on PAP  guidelines re: HGSIL on pap; Colpo due ASAP   - Warning s/sx of postpartum infection, depression, preeclampsia   - RTC w/ active medicaid for Colpo and possible IUD consult            Review of Systems   Constitutional: Negative.    HENT: Negative.    Eyes: Negative.    Respiratory: Negative.    Cardiovascular: Negative.    Gastrointestinal: Negative.    Genitourinary: Negative.    Musculoskeletal: Negative.    Skin: Negative.    Neurological: Negative.    Endo/Heme/Allergies: Negative.    Psychiatric/Behavioral: Negative.           Objective:     LMP 12/16/2016      Physical Exam   Constitutional: She is oriented to person, place, and time. She appears well-developed and well-nourished.   HENT:   Head: Normocephalic and atraumatic.   Eyes: Pupils are equal, round, and reactive to light.   Neck: Normal range of motion. Neck supple. No thyromegaly present.   Cardiovascular: Normal rate and regular rhythm.    Pulmonary/Chest: Effort normal and breath sounds normal.   Abdominal: Soft. Bowel sounds are normal.   Genitourinary: Vagina normal and uterus normal.   Musculoskeletal: Normal range of motion.   Neurological: She is alert and oriented to person, place, and time.   Skin: Skin is warm and dry.   Psychiatric: She has a normal mood and affect. Her behavior is normal.   Pt denies any issues with depression, SI/HI or feeling down. Reports numerous psychosocial issues, for which she will reach out to resources on the list provided to her today.                Assessment/Plan:     1. Rh negative, antepartum

## 2019-03-31 NOTE — PROGRESS NOTES
Pt here today for OB follow up  Pt states still having issues with her hands being numb.   Reports +  [facility] # [phone]  Pharmacy Confirmed.  GBS today   Admitted

## 2023-06-06 ENCOUNTER — HOSPITAL ENCOUNTER (OUTPATIENT)
Facility: MEDICAL CENTER | Age: 38
End: 2023-06-06
Attending: OBSTETRICS & GYNECOLOGY | Admitting: OBSTETRICS & GYNECOLOGY
Payer: MEDICAID

## 2023-06-07 ENCOUNTER — APPOINTMENT (OUTPATIENT)
Dept: ADMISSIONS | Facility: MEDICAL CENTER | Age: 38
End: 2023-06-07
Attending: OBSTETRICS & GYNECOLOGY
Payer: MEDICAID

## 2023-06-16 NOTE — H&P
Obstetrics & Gynecology History & Physical Note    Date  2023    Primary Care Physician  Pcp Pt States None    CC    Scheduled for [facility] here for Pre-op     HPI  This is a 37 y.o. female who presented with Abnormal pap Colpo BX-CIN2 @ 5 oclcok and ECC.    No past medical history on file.   H/o alcohol abuse and rug abuse.    No past surgical history on file.  NAD   No current facility-administered medications for this encounter.     No current outpatient medications on file.       Social History     Socioeconomic History    Marital status: Single     Spouse name: Not on file    Number of children: Not on file    Years of education: Not on file    Highest education level: Not on file   Occupational History    Not on file   Tobacco Use    Smoking status: Not on file    Smokeless tobacco: Not on file   Substance and Sexual Activity    Alcohol use: Not on file    Drug use: Not on file    Sexual activity: Not on file   Other Topics Concern    Not on file   Social History Narrative    Not on file     Social Determinants of Health     Financial Resource Strain: Not on file   Food Insecurity: Not on file   Transportation Needs: Not on file   Physical Activity: Not on file   Stress: Not on file   Social Connections: Not on file   Intimate Partner Violence: Not on file   Housing Stability: Not on file       No family history on file.  NAD.     Allergies  Patient has no allergy information on record.    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 had colpo in third trimester 2018    results reviewed with pt including specific abnormality present and role of HPV virus. All pt questions answered    recommended  LEEP/ [facility] .risks benefits reviewed , risk of miscarriages and  labor reviewed.     Pt interested in  another baby.  pt wants to go ahead with [facility].    * No Diagnosis Codes entered *  Procedure(s):  COLD KNIFE CONE BIOPSY    Discussed with the patient indications for above  . The patient voiced understanding of indications for surgery  at this time.   Discussed with the patient the risks of Surgery . The risks include infection, bleeding, scarring, damage to other organs in the area of operation.   Patient had the opportunity to ask questions regarding procedures. All questions answered to the patient's satisfaction.   Proceed with Surgery

## 2023-06-20 ENCOUNTER — APPOINTMENT (OUTPATIENT)
Dept: RADIOLOGY | Facility: MEDICAL CENTER | Age: 38
End: 2023-06-20
Attending: EMERGENCY MEDICINE
Payer: OTHER MISCELLANEOUS

## 2023-06-20 ENCOUNTER — HOSPITAL ENCOUNTER (EMERGENCY)
Facility: MEDICAL CENTER | Age: 38
End: 2023-06-20
Attending: EMERGENCY MEDICINE
Payer: OTHER MISCELLANEOUS

## 2023-06-20 VITALS
RESPIRATION RATE: 16 BRPM | OXYGEN SATURATION: 95 % | HEART RATE: 81 BPM | BODY MASS INDEX: 50.02 KG/M2 | HEIGHT: 64 IN | SYSTOLIC BLOOD PRESSURE: 127 MMHG | WEIGHT: 293 LBS | TEMPERATURE: 98.8 F | DIASTOLIC BLOOD PRESSURE: 76 MMHG

## 2023-06-20 DIAGNOSIS — S02.2XXA CLOSED FRACTURE OF NASAL BONE, INITIAL ENCOUNTER: ICD-10-CM

## 2023-06-20 PROCEDURE — 70160 X-RAY EXAM OF NASAL BONES: CPT

## 2023-06-20 PROCEDURE — 700102 HCHG RX REV CODE 250 W/ 637 OVERRIDE(OP): Performed by: EMERGENCY MEDICINE

## 2023-06-20 PROCEDURE — 99284 EMERGENCY DEPT VISIT MOD MDM: CPT

## 2023-06-20 PROCEDURE — A9270 NON-COVERED ITEM OR SERVICE: HCPCS | Performed by: EMERGENCY MEDICINE

## 2023-06-20 RX ORDER — IBUPROFEN 600 MG/1
600 TABLET ORAL ONCE
Status: COMPLETED | OUTPATIENT
Start: 2023-06-20 | End: 2023-06-20

## 2023-06-20 RX ADMIN — IBUPROFEN 600 MG: 600 TABLET, FILM COATED ORAL at 19:40

## 2023-06-20 ASSESSMENT — LIFESTYLE VARIABLES
HAVE PEOPLE ANNOYED YOU BY CRITICIZING YOUR DRINKING: NO
CONSUMPTION TOTAL: NEGATIVE
TOTAL SCORE: 0
TOTAL SCORE: 0
EVER HAD A DRINK FIRST THING IN THE MORNING TO STEADY YOUR NERVES TO GET RID OF A HANGOVER: NO
ON A TYPICAL DAY WHEN YOU DRINK ALCOHOL HOW MANY DRINKS DO YOU HAVE: 0
HOW MANY TIMES IN THE PAST YEAR HAVE YOU HAD 5 OR MORE DRINKS IN A DAY: 0
AVERAGE NUMBER OF DAYS PER WEEK YOU HAVE A DRINK CONTAINING ALCOHOL: 0
HAVE YOU EVER FELT YOU SHOULD CUT DOWN ON YOUR DRINKING: NO
EVER FELT BAD OR GUILTY ABOUT YOUR DRINKING: NO
DO YOU DRINK ALCOHOL: NO
TOTAL SCORE: 0

## 2023-06-20 NOTE — LETTER
"  FORM C-4:  EMPLOYEE’S CLAIM FOR COMPENSATION/ REPORT OF INITIAL TREATMENT  EMPLOYEE’S CLAIM - PROVIDE ALL INFORMATION REQUESTED   First Name Tiffany Last Name Joao Birthdate 1985  Sex female Claim Number   Home Address [address]   [place]             [place] [address]                                   Age  37 y.o. Height  1.626 m (5' 4\") Weight  (!) 180 kg (396 lb 9.7 oz) [facility]     Mailing Address [address]  [place]              Zip [address] Telephone  [phone] (home)  Primary Language Spoken  [facility]   Insurer   Third Party   [employer] Employee's Occupation (Job Title) When Injury or Occupational Disease Occurred     Employer's Name [employer] Telephone [phone]    Employer Address [address] [place] [address]   Date of Injury  6/20/2023       Hour of Injury  3:30 PM Date Employer Notified  6/20/2023 Last Day of Work after Injury or Occupational Disease  6/20/2023 Supervisor to Whom Injury Reported  Nisha Garcia   Address or Location of Accident (if applicable) Work [1]   What were you doing at the time of accident? (if applicable) Stuffing clothes into lare box    How did this injury or occupational disease occur? Be specific and answer in detail. Use additional sheet if necessary)  I was pushing clothes into the box unaware there was a empty pocket below & the clothes collapsed I fell on my nose on the left side of the Big Box   If you believe that you have an occupational disease, when did you first have knowledge of the disability and it relationship to your employment? N/A Witnesses to the Accident  Not sure anyone was watching me   Nature of Injury or Occupational Disease  Workers' Compensation Part(s) of Body Injured or Affected  Nose, Skull, Facial Bones    I CERTIFY THAT THE ABOVE IS TRUE AND CORRECT TO THE BEST OF MY KNOWLEDGE AND THAT I HAVE PROVIDED THIS INFORMATION IN ORDER TO OBTAIN THE BENEFITS " OF [place]).  I HEREBY AUTHORIZE ANY PHYSICIAN, CHIROPRACTOR, SURGEON, PRACTITIONER, OR OTHER PERSON, ANY HOSPITAL, INCLUDING [facility] OR [facility], ANY MEDICAL SERVICE ORGANIZATION, ANY INSURANCE COMPANY, OR OTHER INSTITUTION OR ORGANIZATION TO RELEASE TO EACH OTHER, ANY MEDICAL OR OTHER INFORMATION, INCLUDING BENEFITS PAID OR PAYABLE, PERTINENT TO THIS INJURY OR DISEASE, EXCEPT INFORMATION RELATIVE TO DIAGNOSIS, TREATMENT AND/OR COUNSELING FOR AIDS, PSYCHOLOGICAL CONDITIONS, ALCOHOL OR CONTROLLED SUBSTANCES, FOR WHICH I MUST GIVE SPECIFIC AUTHORIZATION.  A PHOTOSTAT OF THIS AUTHORIZATION SHALL BE AS VALID AS THE ORIGINAL.  Date      06/20/2023                                Place          [facility]                        Employee’s Signature   THIS REPORT MUST BE COMPLETED AND MAILED WITHIN 3 WORKING DAYS OF TREATMENT   Place [facility], EMERGENCY DEPT                       Name of Facility [facility]   Date  6/20/2023 Diagnosis  (S02.2XXA) Closed fracture of nasal bone, initial encounter Is there evidence the injured employee was under the influence of alcohol and/or another controlled substance at the time of accident?   Hour  7:35 PM Description of Injury or Disease  Closed fracture of nasal bone, initial encounter No   Treatment  Nasal bone x-ray  Have you advised the patient to remain off work five days or more?         No   X-Ray Findings  Positive  Comments:Small nasal bone fracture If Yes   From Date    To Date      From information given by the employee, together with medical evidence, can you directly connect this injury or occupational disease as job incurred?   If No, is employee capable of: Full Duty  Yes Modified Duty      Is additional medical care by a physician indicated?   Comments:Only if healing is cosmetically unpleasant  "in 6 to 8 weeks. If Modified Duty, Specify any Limitations / Restrictions       Do you know of any previous injury or disease contributing to this condition or occupational disease? Yes  Comments:Patient reports several prior nasal bone fractures and a deviated septum    Date 6/20/2023 Print Doctor’s Name Shannon Delacruz certify the employer’s copy of this form was mailed on:   Address [address]  [place] [id number] [phone] INSURER’S USE ONLY   Provider’s Tax ID Number [id number] Telephone Dept: [phone]    Doctor’s Signature e-SHANNON Miller M.D. Degree  M.D.       Form C-4 (rev.10/07)                                                                         BRIEF DESCRIPTION OF RIGHTS AND BENEFITS  (Pursuant to [place] 616[place].050)    Notice of Injury or Occupational Disease (Incident Report Form C-1): If an injury or occupational disease (OD) arises out of and in the course of employment, you must provide written notice to your employer as soon as practicable, but no later than 7 days after the accident or OD. Your employer shall maintain a sufficient supply of the required forms.    Claim for Compensation (Form C-4): If medical treatment is sought, the form C-4 is available at the place of initial treatment. A completed \"Claim for Compensation\" (Form C-4) must be filed within 90 days after an accident or OD. The treating physician or chiropractor must, within 3 working days after treatment, complete and mail to the employer, the employer's insurer and third-party , the Claim for Compensation.    Medical Treatment: If you require medical treatment for your on-the-job injury or OD, you may be required to select a physician or chiropractor from a list provided by your workers’ compensation insurer, if it has contracted with an Organization for Managed Care (MCO) or Preferred Provider Organization (PPO) or providers of health care. If your employer has not entered into a contract " with an MCO or PPO, you may select a physician or chiropractor from the Panel of Physicians and Chiropractors. Any medical costs related to your industrial injury or OD will be paid by your insurer.    Temporary Total Disability (TTD): If your doctor has certified that you are unable to work for a period of at least 5 consecutive days, or 5 cumulative days in a 20-day period, or places restrictions on you that your employer does not accommodate, you may be entitled to TTD compensation.    Temporary Partial Disability (TPD): If the wage you receive upon reemployment is less than the compensation for TTD to which you are entitled, the insurer may be required to pay you TPD compensation to make up the difference. TPD can only be paid for a maximum of 24 months.    Permanent Partial Disability (PPD): When your medical condition is stable and there is an indication of a PPD as a result of your injury or OD, within 30 days, your insurer must arrange for an evaluation by a rating physician or chiropractor to determine the degree of your PPD. The amount of your PPD award depends on the date of injury, the results of the PPD evaluation, your age and wage.    Permanent Total Disability (PTD): If you are medically certified by a treating physician or chiropractor as permanently and totally disabled and have been granted a PTD status by your insurer, you are entitled to receive monthly benefits not to exceed 66 2/3% of your average monthly wage. The amount of your PTD payments is subject to reduction if you previously received a lump-sum PPD award.    Vocational Rehabilitation Services: You may be eligible for vocational rehabilitation services if you are unable to return to the job due to a permanent physical impairment or permanent restrictions as a result of your injury or occupational disease.    Transportation and Per Ty Reimbursement: You may be eligible for travel expenses and per ty associated with medical  treatment.    Reopening: You may be able to reopen your claim if your condition worsens after claim closure.     Appeal Process: If you disagree with a written determination issued by the insurer or the insurer does not respond to your request, you may appeal to the Department of Administration, , by following the instructions contained in your determination letter. You must appeal the determination within 70 days from the date of the determination letter at [address], [address], [place], [place] [address], or [address], [place], [place] [address]. If you disagree with the  decision, you may appeal to the Department of Administration, . You must file your appeal within 30 days from the date of the  decision letter at [address], [address], [place], [place] [address], or [address], [place], [place] [address]. If you disagree with a decision of an , you may file a petition for judicial review with the District Court. You must do so within 30 days of the Appeal Officer’s decision. You may be represented by an  at your own expense or you may contact the [facility] for possible representation.    [place]  for Injured Workers (NAIW): If you disagree with a  decision, you may request that NAIW represent you without charge at an  Hearing. For information regarding denial of benefits, you may contact the [facility] at: [address], [address], [place] [address], [phone], or [address], [address], [place] [address], [phone]    To File a Complaint with the Division: If you wish to file a complaint with the  of the Division of Industrial Relations (DIR),  please contact the Workers’ Compensation Section, [address], [address], [place], [place] [address], telephone [phone]) [phone], or [address], [address], [place], [place] [address], telephone [phone].    For assistance with Workers’ Compensation Issues: You may contact the [facility] Office for Consumer Health Assistance, [address], [address], [place], [place] [address], Toll Free 1-980.908.6493, Web site: http://[facility].[place].gov/Programs/SAGAR E-mail: sagar@[facility].[place].gov  D-2 (rev. 10/20)              __________________________________________________________________                                    _________________            Employee Name / Signature                                                                                                                            Date

## 2023-06-21 NOTE — ED PROVIDER NOTES
ER Provider Note    Scribed for Emigdio Delacruz M.d. by Ute Morales. 6/20/2023  7:05 PM    Primary Care Provider: Pcp Pt States None    CHIEF COMPLAINT  Chief Complaint   Patient presents with    GLF     Was at work packing clothes in box and fell face first, nose pain since, hx of broken nose reports it feels similar       EXTERNAL RECORDS REVIEWED  Outpatient Notes The patient was last seen in the ED on 09/07/2017 for a labor procedure.    HPI/ROS  LIMITATION TO HISTORY   Select: : None  OUTSIDE HISTORIAN(S):  None.    Tiffany Shepherd is a 37 y.o. female who presents to the ED for evaluation after a ground level fall onset earlier today. She describes that she was at work packing clothes in a box and she fell face first on a box. She notes that there was a big pile of boxes secondary to them not being pushed down, so this caused her to trip. The patient states she also has nose pain, but denies any bleeding. She notes that she was slightly dizzy but states that this has currently subsided. She has a history of multiple broken noses episodes, and she notes that this pain feels similar. She also reports a history of a deviated septum.     PAST MEDICAL HISTORY  Past Medical History:   Diagnosis Date    Diabetes ([facility])     gdm A1    Diabetes in pregnancy (CMS-[facility]) with baby #3 5/30/2017    Insufficient prenatal care in third trimester 6/12/2015    Kidney disease 1/2015    Pt. states was hospitalized    Pregnancy with adoption planned 5/30/2017    Rh negative, antepartum 6/16/2017    Substance abuse (HCC)     meth, marijuana pt. [facility] last used 2/2015    Tobacco dependence 5/30/2017    Urinary tract infection, site not specified      SURGICAL HISTORY  History reviewed. No pertinent surgical history.    FAMILY HISTORY  Family History   Problem Relation Age of Onset    Cancer Mother         uterus    Alcohol/Drug Father     Other Brother         obese    Other Maternal Grandmother          "althzeimers    Heart Attack Maternal Grandfather     Heart Attack Paternal Grandfather      SOCIAL HISTORY   reports that she has been smoking cigarettes. She has a 15.00 pack-year smoking history. She has quit using smokeless tobacco. She reports current drug use. Drugs: Methamphetamines and Marijuana. She reports that she does not drink alcohol.    CURRENT MEDICATIONS  Discharge Medication List as of 6/20/2023  7:47 PM        CONTINUE these medications which have NOT CHANGED    Details   Norgestim-Eth Estrad Triphasic 0.18/0.215/0.25 MG-35 MCG Tab Take 1 Tab by mouth every day., Disp-28 Tab, R-6, Normal      norgestimate-ethinyl estradiol (ORTHO-CYCLEN) 0.25-35 MG-MCG per tablet Take 1 Tab by mouth every day., Disp-28 Tab, R-11, Normal      ibuprofen (MOTRIN) 800 MG TABS Take 1 Tab by mouth every 6 hours as needed (cramping after delivery)., Disp-30 Tab, R-3, Print Rx Paper      ferrous sulfate 325 (65 FE) MG tablet Take 1 Tab by mouth every day., Disp-30 Tab, R-3, Print Rx Paper      docusate sodium (COLACE) 100 MG CAPS Take 1 Cap by mouth 2 times a day., Disp-60 Cap, R-3, Print Rx Paper      Prenatal MV-Min-Fe Fum-FA-DHA (PRENATAL 1 PO) Take  by mouth., Historical Med      cefdinir (OMNICEF) 300 MG CAPS Take 1 Cap by mouth 2 times a day., Disp-20 Cap, R-0, Print Rx Paper           ALLERGIES  Patient has no known allergies.    PHYSICAL EXAM  VITAL SIGNS: BP (!) 127/90   Pulse 80   Temp 37.1 °C (98.7 °F) (Temporal)   Resp 16   Ht 1.626 m (5' 4\")   Wt (!) 180 kg (396 lb 9.7 oz)   SpO2 95%   BMI 68.08 kg/m²     Pulse ox interpretation: I interpret this pulse ox as normal.  Constitutional: Alert in no apparent distress.  HENT: No septal hematoma, bilateral external ears normal. Septal deviation to the right.  No obvious acute trauma.  Eyes: Pupils are equal and reactive, Conjunctiva normal, Non-icteric.   Neck: Normal range of motion, Supple, No stridor.    Cardiovascular: Normal peripheral perfusion  Thorax & " Lungs: Unlabored respirations, equal chest expansion, no accessory muscle use  Abdomen: Non-distended  Skin:  No erythema, No rash.   Back: Normal alignment and ROM  Extremities: No gross deformity  Musculoskeletal: Good range of motion in all major joints.   Neurologic: Alert, Normal motor function, No focal deficits noted.   Psychiatric: Affect normal, Judgment normal, Mood normal.     DIAGNOSTIC STUDIES    Radiology:   The attending emergency physician has independently interpreted the diagnostic imaging associated with this visit and am waiting the final reading from the radiologist.   Preliminary interpretation is a follows: Small distal nasal bone fracture  Radiologist interpretation:   DX-NASAL BONES 3+   Final Result      Nasal fracture         COURSE & MEDICAL DECISION MAKING     ED Observation Status? No; Patient does not meet criteria for ED Observation.     INITIAL ASSESSMENT, COURSE AND PLAN  Care Narrative:     7:20 PM - Patient seen and examined at bedside. Discussed plan of care, including performing imaging of her nose. Patient agrees to the plan of care. Ordered for DX-Nasal Bones to evaluate her symptoms.      Differential diagnoses include, but are not limited to: Septal or Nasal bone fracture.     7:35 PM - Patient was reevaluated at bedside. I informed the patient that she has a chip, but alignment appears normal and should heal without intervention.  I did provide contact information for plastic surgery in case she would like follow-up for this for any reason, but more likely for prior nasal bone and septal fractures causing some mild but obvious deviation.  She asked to have some ibuprofen for the nose pain, The patient will be medicated with ibuprofen 600 mg. I recommended the patent continue using ibuprofen or Tylenol for at home pain management. Discussed discharge instructions and return precautions with the patient and they were cleared for discharge. Patient was given the opportunity to  ask any further questions. She is comfortable with discharge at this time.           DISPOSITION AND DISCUSSIONS  I have discussed management of the patient with the following physicians and KELTON's:  None.    Discussion of management with other [facility] or appropriate source(s): None     Escalation of care considered, and ultimately not performed: blood analysis,, but there was no suggestion of bleeding or systemic illness.    Barriers to care at this time, including but not limited to: Patient does not have established PCP.     Decision tools and prescription drugs considered including, but not limited to: Pain Medications , but over-the-counter medications should be adequate .    The patient will return for new or worsening symptoms and is stable at the time of discharge.    DISPOSITION:  Patient will be discharged home in stable condition.    FOLLOW UP:  Glen Osborne M.D.  [address] [place] [address] [id number]  [facility] [id number]  [phone]          FINAL DIAGNOSIS  1. Closed fracture of nasal bone, initial encounter       IUte (Comfort[employer]), am scribing for, and in the presence of, Emigdio Delacruz M.D..    Electronically signed by: Ute Morales (Comfort[employer]), 6/20/2023    IEmigdio M.D. personally performed the services described in this documentation, as scribed by Ute Morales in my presence, and it is both accurate and complete.      The note accurately reflects work and decisions made by me.  Emigdio Delacruz M.D.  6/20/2023  9:47 PM

## 2023-06-21 NOTE — ED TRIAGE NOTES
Pt ambulates to triage  Chief Complaint   Patient presents with    GLF     Was at work packing clothes in box and fell face first, nose pain since, hx of broken nose reports it feels similar       Pt A & 0 x 4, speech clear, ambulates well    Pt updated on triage process and asked to inform RN of any changes while waiting in lobby.

## 2023-06-21 NOTE — ED NOTES
Patient is stable for discharge at this time, anticipatory guidance provided, close follow-up is encouraged, and ED return instructions have been detailed. Patient is both agreeable to the disposition and plan and discharged home in ambulatory state and in good condition.      Rx education provided, Pt verbalized understanding

## 2023-06-21 NOTE — ED NOTES
Pt ambulated on her own back to ED 7A. Pt changed into gown and placed on monitor. Pt is updated on POC. Juniorrney in the lowest position, side rails are up and call light within reach. Pt educated to let RN know if condition gets worst or if the pt needs anything.

## 2023-06-23 ENCOUNTER — PRE-ADMISSION TESTING (OUTPATIENT)
Dept: ADMISSIONS | Facility: MEDICAL CENTER | Age: 38
End: 2023-06-23
Attending: OBSTETRICS & GYNECOLOGY
Payer: MEDICAID

## 2023-06-23 VITALS — HEIGHT: 64 IN

## 2023-07-03 ENCOUNTER — PRE-ADMISSION TESTING (OUTPATIENT)
Dept: ADMISSIONS | Facility: MEDICAL CENTER | Age: 38
End: 2023-07-03
Attending: OBSTETRICS & GYNECOLOGY
Payer: MEDICAID

## 2023-07-03 DIAGNOSIS — Z01.812 PRE-OPERATIVE LABORATORY EXAMINATION: ICD-10-CM

## 2023-07-03 NOTE — OR NURSING
Attempted to call patient regarding missed testing appointment today. No answer, voicemail full. Left message for Dr. Lechuga's office regarding missed testing appointment.

## 2024-02-19 ENCOUNTER — OFFICE VISIT (OUTPATIENT)
Dept: URGENT CARE | Facility: CLINIC | Age: 39
End: 2024-02-19
Payer: MEDICAID

## 2024-02-19 VITALS
BODY MASS INDEX: 31.63 KG/M2 | WEIGHT: 185.3 LBS | TEMPERATURE: 98 F | OXYGEN SATURATION: 100 % | HEIGHT: 64 IN | DIASTOLIC BLOOD PRESSURE: 66 MMHG | SYSTOLIC BLOOD PRESSURE: 128 MMHG | HEART RATE: 82 BPM | RESPIRATION RATE: 18 BRPM

## 2024-02-19 DIAGNOSIS — K12.2 ORAL INFECTION: ICD-10-CM

## 2024-02-19 DIAGNOSIS — S02.5XXS OPEN FRACTURE OF TOOTH, SEQUELA: ICD-10-CM

## 2024-02-19 PROCEDURE — 3078F DIAST BP <80 MM HG: CPT | Performed by: PHYSICIAN ASSISTANT

## 2024-02-19 PROCEDURE — 99204 OFFICE O/P NEW MOD 45 MIN: CPT | Performed by: PHYSICIAN ASSISTANT

## 2024-02-19 PROCEDURE — 3074F SYST BP LT 130 MM HG: CPT | Performed by: PHYSICIAN ASSISTANT

## 2024-02-19 RX ORDER — IBUPROFEN 600 MG/1
600 TABLET ORAL EVERY 6 HOURS PRN
Qty: 30 TABLET | Refills: 0 | Status: SHIPPED | OUTPATIENT
Start: 2024-02-19 | End: 2024-03-12

## 2024-02-19 RX ORDER — LIDOCAINE HYDROCHLORIDE 20 MG/ML
5 SOLUTION OROPHARYNGEAL PRN
Qty: 100 ML | Refills: 0 | Status: SHIPPED | OUTPATIENT
Start: 2024-02-19 | End: 2024-03-12

## 2024-02-19 RX ORDER — AMOXICILLIN AND CLAVULANATE POTASSIUM 875; 125 MG/1; MG/1
1 TABLET, FILM COATED ORAL 2 TIMES DAILY
Qty: 14 TABLET | Refills: 0 | Status: SHIPPED | OUTPATIENT
Start: 2024-02-19 | End: 2024-02-26

## 2024-02-19 ASSESSMENT — PAIN SCALES - GENERAL: PAINLEVEL: 10=SEVERE PAIN

## 2024-02-20 ASSESSMENT — ENCOUNTER SYMPTOMS
NAUSEA: 0
VOMITING: 0
CHILLS: 0
FEVER: 0

## 2024-02-20 NOTE — PROGRESS NOTES
Subjective:   Tiffany De Santiago is a 38 y.o. female who presents for Oral Pain (X mouth infection started 6 months ago )        Patient presents with concerns of acute on chronic oral pain.  Symptoms have been worsening over the last few days.  States that she has poor dentition and needs to have several teeth extracted.  She has had tooth infections in the past and current symptoms feel similar.  Pain is predominantly on the left lower jaw.  No nausea, vomiting, fevers, chills.      Review of Systems   Constitutional:  Negative for chills and fever.   Gastrointestinal:  Negative for nausea and vomiting.       PMH:  has a past medical history of Diabetes ([facility]), Diabetes in pregnancy ([facility]) with baby #3 (5/30/2017), Insufficient prenatal care in third trimester (6/12/2015), Kidney disease (1/2015), Pregnancy with adoption planned (5/30/2017), Psychiatric problem, Renal disorder, Rh negative, antepartum (6/16/2017), Substance abuse ([facility]), Tobacco dependence (5/30/2017), and Urinary tract infection, site not specified.    She has no past medical history of Depression.  MEDS:   Current Outpatient Medications:     amoxicillin-clavulanate (AUGMENTIN) 875-125 MG Tab, Take 1 Tablet by mouth 2 times a day for 7 days., Disp: 14 Tablet, Rfl: 0    ibuprofen (MOTRIN) 600 MG Tab, Take 1 Tablet by mouth every 6 hours as needed for Moderate Pain., Disp: 30 Tablet, Rfl: 0    lidocaine (XYLOCAINE) 2 % Solution, Take 5 mL by mouth as needed for Throat/Mouth Pain., Disp: 100 mL, Rfl: 0    Norgestim-Eth Estrad Triphasic 0.18/0.215/0.25 MG-35 MCG Tab, Take 1 Tab by mouth every day. (Patient not taking: Reported on 2/19/2024), Disp: 28 Tab, Rfl: 6    norgestimate-ethinyl estradiol (ORTHO-CYCLEN) 0.25-35 MG-MCG per tablet, Take 1 Tab by mouth every day. (Patient not taking: Reported on 2/19/2024), Disp: 28 Tab, Rfl: 11    ferrous sulfate 325 (65 FE) MG tablet, Take 1 Tab by mouth every day. (Patient not taking: Reported on  "2/19/2024), Disp: 30 Tab, Rfl: 3    docusate sodium (COLACE) 100 MG CAPS, Take 1 Cap by mouth 2 times a day. (Patient not taking: Reported on 2/19/2024), Disp: 60 Cap, Rfl: 3    Prenatal MV-Min-Fe Fum-FA-DHA (PRENATAL 1 PO), Take  by mouth. (Patient not taking: Reported on 2/19/2024), Disp: , Rfl:   ALLERGIES: No Known Allergies  SURGHX: History reviewed. No pertinent surgical history.  SOCHX:  reports that she has been smoking cigarettes. She started smoking about 29 years ago. She has a 14.6 pack-year smoking history. She has never used smokeless tobacco. She reports current alcohol use of about 0.6 oz of alcohol per week. She reports current drug use. Drugs: Methamphetamines, Marijuana, and Inhaled.  FH: Family history was reviewed, no pertinent findings to report   Objective:   /66 (BP Location: Right arm, Patient Position: Sitting, BP Cuff Size: Adult)   Pulse 82   Temp 36.7 °C (98 °F) (Temporal)   Resp 18   Ht 1.626 m (5' 4\")   Wt 84.1 kg (185 lb 4.8 oz)   SpO2 100%   BMI 31.81 kg/m²   Physical Exam  Vitals reviewed.   Constitutional:       General: She is not in acute distress.     Appearance: Normal appearance. She is well-developed. She is not toxic-appearing.   HENT:      Head: Normocephalic and atraumatic.      Right Ear: External ear normal.      Left Ear: External ear normal.      Nose: Nose normal.      Mouth/Throat:      Lips: Pink.      Mouth: Mucous membranes are moist.      Pharynx: Oropharynx is clear.      Comments: Poor and absent dentition with multiple chronic open fractures of remaining teeth.  Left lower gingival erythema and edema.  Left lower jaw diffusely tender to palpation.  No palpable masses.  No trismus or drooling.  Posterior oropharynx is patent and patient does not have any difficulty opening her mouth.  Cardiovascular:      Rate and Rhythm: Normal rate and regular rhythm.   Pulmonary:      Effort: Pulmonary effort is normal. No respiratory distress.      Breath " sounds: No stridor.   Skin:     General: Skin is dry.   Neurological:      Comments: Alert and oriented.    Psychiatric:         Speech: Speech normal.         Behavior: Behavior normal.           Assessment/Plan:   1. Oral infection  - amoxicillin-clavulanate (AUGMENTIN) 875-125 MG Tab; Take 1 Tablet by mouth 2 times a day for 7 days.  Dispense: 14 Tablet; Refill: 0  - ibuprofen (MOTRIN) 600 MG Tab; Take 1 Tablet by mouth every 6 hours as needed for Moderate Pain.  Dispense: 30 Tablet; Refill: 0  - lidocaine (XYLOCAINE) 2 % Solution; Take 5 mL by mouth as needed for Throat/Mouth Pain.  Dispense: 100 mL; Refill: 0    2. Open fracture of tooth, sequela    Patient started on Augmentin and may take ibuprofen and use topical lidocaine as needed for pain.  Recommend that she follow-up with dental soonest for definitive management.  We also reviewed red flag signs and symptoms and patient was given strict ED precautions.

## 2024-03-12 ENCOUNTER — OFFICE VISIT (OUTPATIENT)
Dept: URGENT CARE | Facility: CLINIC | Age: 39
End: 2024-03-12
Payer: OTHER GOVERNMENT

## 2024-03-12 VITALS
SYSTOLIC BLOOD PRESSURE: 138 MMHG | HEART RATE: 91 BPM | OXYGEN SATURATION: 99 % | RESPIRATION RATE: 16 BRPM | DIASTOLIC BLOOD PRESSURE: 82 MMHG | TEMPERATURE: 98.2 F

## 2024-03-12 DIAGNOSIS — F19.96 DRUG-INDUCED MEMORY LOSS (HCC): ICD-10-CM

## 2024-03-12 DIAGNOSIS — N89.8 VAGINAL IRRITATION: ICD-10-CM

## 2024-03-12 DIAGNOSIS — T50.901A DRUG INGESTION, ACCIDENTAL OR UNINTENTIONAL, INITIAL ENCOUNTER: ICD-10-CM

## 2024-03-12 PROCEDURE — 3079F DIAST BP 80-89 MM HG: CPT | Performed by: PHYSICIAN ASSISTANT

## 2024-03-12 PROCEDURE — 99214 OFFICE O/P EST MOD 30 MIN: CPT | Performed by: PHYSICIAN ASSISTANT

## 2024-03-12 PROCEDURE — 3075F SYST BP GE 130 - 139MM HG: CPT | Performed by: PHYSICIAN ASSISTANT

## 2024-03-12 ASSESSMENT — ENCOUNTER SYMPTOMS
SHORTNESS OF BREATH: 0
HEADACHES: 0
PALPITATIONS: 0
ABDOMINAL PAIN: 0

## 2024-03-13 NOTE — PROGRESS NOTES
Subjective:     CHIEF COMPLAINT  Chief Complaint   Patient presents with    Drug Ingestion     Pt states that she given DMT, lost track of time and came to with vaginal soreness. No recollection of what has happened and is wanting to be checked out. [phone]       HPI  Tiffany De Santiago is a very pleasant 38 y.o. female who presents to the clinic after an accidental inhalation of DMT this afternoon with memory loss and vaginal soreness.  Patient states she was dropping off a truck at an acquaintance's house.  States she did not know the man very well.  They were smoking which she believed to be a dab of marijuana however it turned out she actually smoked DMT.  States she smoked around 11 AM this morning and woke up around 1130 without any recollection of what happened.  States she woke up lying on the ground with her phone by her feet.  States her underwear and legs were wet and she was experiencing soreness near her vagina.  Patient states she asked the male what happened and he states that he smoked DMT.  He denied any foul play.    REVIEW OF SYSTEMS  Review of Systems   Respiratory:  Negative for shortness of breath.    Cardiovascular:  Negative for chest pain and palpitations.   Gastrointestinal:  Negative for abdominal pain.   Genitourinary:         Vaginal soreness   Skin:  Negative for rash.   Neurological:  Negative for headaches.        Loss of memory for a period of 30 minutes after smoking DMT.       PAST MEDICAL HISTORY  Patient Active Problem List    Diagnosis Date Noted    HSIL on Pap smear of cervix 06/02/2017    Diabetes in pregnancy (CMS-[facility]) with baby #3 05/30/2017    Tobacco dependence 05/30/2017       SURGICAL HISTORY  patient denies any surgical history    ALLERGIES  No Known Allergies    CURRENT MEDICATIONS  Home Medications       Reviewed by Vasiliy Miranda P.A.-C. (Physician Assistant) on 03/12/24 at 1751  Med List Status: <None>     Medication Last Dose Status   docusate sodium  (COLACE) 100 MG CAPS Not Taking Active   ferrous sulfate 325 (65 FE) MG tablet Not Taking Active   ibuprofen (MOTRIN) 600 MG Tab Not Taking Active   lidocaine (XYLOCAINE) 2 % Solution Not Taking Active   Norgestim-Eth Estrad Triphasic 0.18/0.215/0.25 MG-35 MCG Tab Not Taking Active   norgestimate-ethinyl estradiol (ORTHO-CYCLEN) 0.25-35 MG-MCG per tablet Not Taking Active   Prenatal MV-Min-Fe Fum-FA-DHA (PRENATAL 1 PO) Not Taking Active                    SOCIAL HISTORY  Social History     Tobacco Use    Smoking status: Every Day     Current packs/day: 0.50     Average packs/day: 0.5 packs/day for 29.2 years (14.6 ttl pk-yrs)     Types: Cigarettes     Start date: 1995    Smokeless tobacco: Never   Vaping Use    Vaping Use: Never used   Substance and Sexual Activity    Alcohol use: Yes     Alcohol/week: 0.6 oz     Types: 1 Shots of liquor per week     Comment: weekends and social drinking    Drug use: Yes     Types: Methamphetamines, Marijuana, Inhaled     Comment: Pt. states last used 2/2015. marijuana used 4/2016    Sexual activity: Yes     Partners: Male     Comment: none       FAMILY HISTORY  Family History   Problem Relation Age of Onset    Cancer Mother         uterus    Alcohol/Drug Father     Other Brother         obese    Other Maternal Grandmother         althzeimers    Heart Attack Maternal Grandfather     Heart Attack Paternal Grandfather           Objective:     VITAL SIGNS: /82   Pulse 91   Temp 36.8 °C (98.2 °F)   Resp 16   SpO2 99%     PHYSICAL EXAM  Physical Exam  Constitutional:       Appearance: Normal appearance.   HENT:      Head: Normocephalic and atraumatic.   Eyes:      Conjunctiva/sclera: Conjunctivae normal.   Pulmonary:      Effort: Pulmonary effort is normal.   Musculoskeletal:      Cervical back: Normal range of motion.   Neurological:      General: No focal deficit present.      Mental Status: She is alert and oriented to person, place, and time. Mental status is at baseline.    Psychiatric:         Mood and Affect: Mood normal.         Behavior: Behavior normal.         Thought Content: Thought content normal.         Judgment: Judgment normal.         Assessment/Plan:     1. Drug ingestion, accidental or unintentional, initial encounter    2. Vaginal irritation    3. Drug-induced memory loss (HCC)      MDM/Comments:    Patient presents to the clinic today after an accidental/unintentional ingestion of DMT.  She noted losing all memory for a period of 30 minutes after ingestion.  Woke up with her pants wet and experiencing vaginal soreness.  Presenting to the clinic to rule out any potential foul play.  Discussed with the patient that we do not have the proper tools in the urgent care to perform an appropriate exam and test for foul play/drug ingestion.  Patient will present to the ED for further workup and evaluation.    Differential diagnosis, natural history, supportive care, and indications for immediate follow-up discussed. All questions answered. Patient agrees with the plan of care.    Follow-up as needed if symptoms worsen or fail to improve to PCP, Urgent care or Emergency Room.    I have personally reviewed prior external notes and test results pertinent to today's visit.  I have independently reviewed and interpreted all diagnostics ordered during this urgent care acute visit.   Discussed management options (risks,benefits, and alternatives to treatment). Pt expresses understanding and the treatment plan was agreed upon. Questions were encouraged and answered to pt's satisfaction.    Please note that this dictation was created using voice recognition software. I have made a reasonable attempt to correct obvious errors, but I expect that there are errors of grammar and possibly content that I did not discover before finalizing the note.